# Patient Record
Sex: MALE | Race: WHITE | NOT HISPANIC OR LATINO | Employment: OTHER | URBAN - METROPOLITAN AREA
[De-identification: names, ages, dates, MRNs, and addresses within clinical notes are randomized per-mention and may not be internally consistent; named-entity substitution may affect disease eponyms.]

---

## 2023-11-20 ENCOUNTER — ANCILLARY PROCEDURE (OUTPATIENT)
Dept: RADIOLOGY | Facility: CLINIC | Age: 63
End: 2023-11-20
Payer: COMMERCIAL

## 2023-11-20 DIAGNOSIS — M17.11 PRIMARY OSTEOARTHRITIS OF RIGHT KNEE: ICD-10-CM

## 2023-11-20 PROCEDURE — 73560 X-RAY EXAM OF KNEE 1 OR 2: CPT | Mod: RIGHT SIDE | Performed by: RADIOLOGY

## 2023-11-20 PROCEDURE — 73560 X-RAY EXAM OF KNEE 1 OR 2: CPT | Mod: RT

## 2023-12-11 ENCOUNTER — OFFICE VISIT (OUTPATIENT)
Dept: ORTHOPEDIC SURGERY | Facility: HOSPITAL | Age: 63
End: 2023-12-11
Payer: COMMERCIAL

## 2023-12-11 DIAGNOSIS — M12.561 TRAUMATIC ARTHRITIS OF RIGHT KNEE: Primary | ICD-10-CM

## 2023-12-11 DIAGNOSIS — M17.11 PRIMARY OSTEOARTHRITIS OF RIGHT KNEE: ICD-10-CM

## 2023-12-11 DIAGNOSIS — Z96.651 STATUS POST TOTAL KNEE REPLACEMENT USING CEMENT, RIGHT: ICD-10-CM

## 2023-12-11 PROCEDURE — 99215 OFFICE O/P EST HI 40 MIN: CPT | Performed by: ORTHOPAEDIC SURGERY

## 2023-12-12 NOTE — PROGRESS NOTES
Chief complaint status post conversion to right total knee replacement on 4/30/2021 now having some pain and instability.    History this is a 63-year-old male who had conversion to a right total knee replacement for a [facility] injury.  I have not seen him since April 2022.  He is followed by Dr. Giancarlo Luong and still is on disability for this injury.  This total knee replacement was required as a direct sequelae complication of a tibial plateau fracture and secondary posttraumatic arthritis.  He notices in the last year that the knee somewhat wobbles from time to time.  On a scale of 1-10 he has about 3-4 pain on a routine basis and some swelling.  He has had no fever or chills or sign of infection.    His physical exam reveals a 63-year-old male whose able to ambulate without the use of a cane or walker.  He does have an antalgic gait.  He is able to get up on the exam table.    His right lower extremity reveals that he has full knee extension and about 120 degrees of knee flexion.  He does have a 2+ shift medial laterally in full extension and about 30 degrees of flexion.  It is not severe but it is reproducible.  His patella tracks well.  He has no anterior posterior instability.  He comes out to full extension maybe 1 to 2 degrees of hyperextension.  He has a 2+ dorsalis pedis and posterior tibial pulse.    X-rays of the right knee were done today which display a right total knee replacement in good position.  He has a stem in the tibia which has not subsided or showing any signs of loosening.  The mechanical axis is well-maintained the femur is in good position.  He has a total stabilized tibial poly component which shows no sign of wear.    Assessment 2 and half years status post conversion of previous tibial plateau fracture to right total knee replacement having some residual pain appears to be related to some ligament laxity most likely he is stretching out the damaged ligaments from his original  trauma.    Plan I have talked about the situation with the patient.  I think the instability is rather minimal at this time.  He has about 1-2+ shift medial laterally but he has a stabilized component which is not letting anything sublux.  I think if he would do a little bit more strengthening of the quads and hams he would be in better condition.  In the future if he should develop more instability then increasing the thickness of the poly component may be beneficial.  At this time I think that is much to aggressive.  In fact I think the knee is stable enough that he does not need a knee brace and he tells me he would not wear it anyway most likely.  I would be happy to follow him a little bit more closely.  I have made an appointment to see me in 9 months during the summer.  I want a repeat x-ray of the knee at that time AP and lateral.    Cc copy to Dr. Giancarlo Luong

## 2024-03-23 ENCOUNTER — HOSPITAL ENCOUNTER (OUTPATIENT)
Dept: RADIOLOGY | Facility: HOSPITAL | Age: 64
Discharge: HOME | End: 2024-03-23
Payer: COMMERCIAL

## 2024-03-23 DIAGNOSIS — M17.31 UNILATERAL POST-TRAUMATIC OSTEOARTHRITIS, RIGHT KNEE: ICD-10-CM

## 2024-03-23 DIAGNOSIS — M12.061: ICD-10-CM

## 2024-03-23 DIAGNOSIS — M89.8X6 OTHER SPECIFIED DISORDERS OF BONE, LOWER LEG: ICD-10-CM

## 2024-03-23 PROCEDURE — 73721 MRI JNT OF LWR EXTRE W/O DYE: CPT | Mod: RT

## 2024-03-23 PROCEDURE — 73721 MRI JNT OF LWR EXTRE W/O DYE: CPT | Mod: RIGHT SIDE | Performed by: RADIOLOGY

## 2024-03-25 ENCOUNTER — LAB (OUTPATIENT)
Dept: LAB | Facility: LAB | Age: 64
End: 2024-03-25
Payer: COMMERCIAL

## 2024-03-25 DIAGNOSIS — M12.061: ICD-10-CM

## 2024-03-25 DIAGNOSIS — M89.8X6 OTHER SPECIFIED DISORDERS OF BONE, LOWER LEG: ICD-10-CM

## 2024-03-25 DIAGNOSIS — M17.31 UNILATERAL POST-TRAUMATIC OSTEOARTHRITIS, RIGHT KNEE: ICD-10-CM

## 2024-03-25 LAB
BASOPHILS # BLD AUTO: 0.05 X10*3/UL (ref 0–0.1)
BASOPHILS NFR BLD AUTO: 0.8 %
CRP SERPL-MCNC: 0.43 MG/DL
EOSINOPHIL # BLD AUTO: 0.09 X10*3/UL (ref 0–0.7)
EOSINOPHIL NFR BLD AUTO: 1.5 %
ERYTHROCYTE [DISTWIDTH] IN BLOOD BY AUTOMATED COUNT: 12.5 % (ref 11.5–14.5)
ERYTHROCYTE [SEDIMENTATION RATE] IN BLOOD BY WESTERGREN METHOD: 26 MM/H (ref 0–20)
HCT VFR BLD AUTO: 39 % (ref 41–52)
HGB BLD-MCNC: 13.1 G/DL (ref 13.5–17.5)
IMM GRANULOCYTES # BLD AUTO: 0.02 X10*3/UL (ref 0–0.7)
IMM GRANULOCYTES NFR BLD AUTO: 0.3 % (ref 0–0.9)
LYMPHOCYTES # BLD AUTO: 2.08 X10*3/UL (ref 1.2–4.8)
LYMPHOCYTES NFR BLD AUTO: 33.8 %
MCH RBC QN AUTO: 32 PG (ref 26–34)
MCHC RBC AUTO-ENTMCNC: 33.6 G/DL (ref 32–36)
MCV RBC AUTO: 95 FL (ref 80–100)
MONOCYTES # BLD AUTO: 0.72 X10*3/UL (ref 0.1–1)
MONOCYTES NFR BLD AUTO: 11.7 %
NEUTROPHILS # BLD AUTO: 3.2 X10*3/UL (ref 1.2–7.7)
NEUTROPHILS NFR BLD AUTO: 51.9 %
NRBC BLD-RTO: 0 /100 WBCS (ref 0–0)
PLATELET # BLD AUTO: 285 X10*3/UL (ref 150–450)
RBC # BLD AUTO: 4.09 X10*6/UL (ref 4.5–5.9)
WBC # BLD AUTO: 6.2 X10*3/UL (ref 4.4–11.3)

## 2024-03-25 PROCEDURE — 85025 COMPLETE CBC W/AUTO DIFF WBC: CPT

## 2024-03-25 PROCEDURE — 86140 C-REACTIVE PROTEIN: CPT

## 2024-03-25 PROCEDURE — 36415 COLL VENOUS BLD VENIPUNCTURE: CPT

## 2024-03-25 PROCEDURE — 85652 RBC SED RATE AUTOMATED: CPT

## 2024-04-15 ENCOUNTER — TELEPHONE (OUTPATIENT)
Dept: ORTHOPEDIC SURGERY | Facility: HOSPITAL | Age: 64
End: 2024-04-15
Payer: COMMERCIAL

## 2024-04-16 NOTE — TELEPHONE ENCOUNTER
Copied from CRM #[id number]. Topic: Needs Earlier Appointment  >> Apr 13, 2024 11:52 AM Nilda LIMON wrote:  Patient is scheduled with Dr Brewer on 05/13/24 to [facility] on R knee.  He is inquiring if office could see him sooner.  His R knee started swelling on Thursday.  Swelling went down, but now there is a bruise on knee. He did not hit knee.  He took pictures, but is not sure how to upload to [employer].   He advised that swelling and bruising comes and goes and comes back again.  He is open to office location.  He is requesting a call back from office.  Thank you!

## 2024-04-29 ENCOUNTER — OFFICE VISIT (OUTPATIENT)
Dept: ORTHOPEDIC SURGERY | Facility: HOSPITAL | Age: 64
End: 2024-04-29
Payer: COMMERCIAL

## 2024-04-29 ENCOUNTER — HOSPITAL ENCOUNTER (OUTPATIENT)
Dept: RADIOLOGY | Facility: HOSPITAL | Age: 64
Discharge: HOME | End: 2024-04-29
Payer: COMMERCIAL

## 2024-04-29 DIAGNOSIS — T84.032S LOOSENING OF PROSTHESIS OF RIGHT TOTAL KNEE REPLACEMENT, SEQUELA: Primary | ICD-10-CM

## 2024-04-29 DIAGNOSIS — Z96.651 STATUS POST TOTAL KNEE REPLACEMENT USING CEMENT, RIGHT: ICD-10-CM

## 2024-04-29 PROBLEM — T84.032A: Status: ACTIVE | Noted: 2024-04-29

## 2024-04-29 PROCEDURE — 73560 X-RAY EXAM OF KNEE 1 OR 2: CPT | Mod: RT

## 2024-04-29 PROCEDURE — 73560 X-RAY EXAM OF KNEE 1 OR 2: CPT | Mod: RIGHT SIDE | Performed by: RADIOLOGY

## 2024-04-29 PROCEDURE — 99215 OFFICE O/P EST HI 40 MIN: CPT | Performed by: ORTHOPAEDIC SURGERY

## 2024-04-29 PROCEDURE — 99215 OFFICE O/P EST HI 40 MIN: CPT | Mod: 57 | Performed by: ORTHOPAEDIC SURGERY

## 2024-04-29 PROCEDURE — L1812 KO ELASTIC W/JOINTS PRE OTS: HCPCS | Performed by: ORTHOPAEDIC SURGERY

## 2024-05-13 ENCOUNTER — APPOINTMENT (OUTPATIENT)
Dept: ORTHOPEDIC SURGERY | Facility: HOSPITAL | Age: 64
End: 2024-05-13
Payer: COMMERCIAL

## 2024-07-18 DIAGNOSIS — M12.561 TRAUMATIC ARTHRITIS OF RIGHT KNEE: ICD-10-CM

## 2024-07-18 DIAGNOSIS — Z96.651 STATUS POST TOTAL KNEE REPLACEMENT USING CEMENT, RIGHT: ICD-10-CM

## 2024-07-18 DIAGNOSIS — T84.032S LOOSENING OF PROSTHESIS OF RIGHT TOTAL KNEE REPLACEMENT, SEQUELA: ICD-10-CM

## 2024-07-29 ENCOUNTER — HOSPITAL ENCOUNTER (OUTPATIENT)
Dept: RADIOLOGY | Facility: HOSPITAL | Age: 64
Discharge: HOME | End: 2024-07-29
Payer: COMMERCIAL

## 2024-07-29 ENCOUNTER — OFFICE VISIT (OUTPATIENT)
Dept: ORTHOPEDIC SURGERY | Facility: HOSPITAL | Age: 64
End: 2024-07-29
Payer: COMMERCIAL

## 2024-07-29 DIAGNOSIS — T84.032S LOOSENING OF PROSTHESIS OF RIGHT TOTAL KNEE REPLACEMENT, SEQUELA: ICD-10-CM

## 2024-07-29 DIAGNOSIS — Z96.651 STATUS POST TOTAL KNEE REPLACEMENT USING CEMENT, RIGHT: ICD-10-CM

## 2024-07-29 PROCEDURE — 72170 X-RAY EXAM OF PELVIS: CPT

## 2024-07-29 PROCEDURE — 99214 OFFICE O/P EST MOD 30 MIN: CPT | Performed by: ORTHOPAEDIC SURGERY

## 2024-07-29 PROCEDURE — 99214 OFFICE O/P EST MOD 30 MIN: CPT | Mod: 57 | Performed by: ORTHOPAEDIC SURGERY

## 2024-07-29 NOTE — PROGRESS NOTES
chief complaint my right knee still bothering but I also get pain that goes 5 back into my thigh along my leg and into my foot.    History 63-year-old male who is well-known to me he had a conversion from a previous tibial plateau fracture in 2021 to right total knee replacement.  Removed his hardware and the secondary total knee replacement.  His infectious workup has been negative.  He has a universal tibial component in place with an 11 mm poly which is a TS.  He has a short stem on his tibia and there appears to be no sign loosening of the tibia or femur.    On physical examination today he has a positive straight leg raise on the right side which causes symptoms of radiculopathy in the right lower extremity.  He does appear to have 5 out of 5 motor strength at the hip knee and foot with 2+ dorsalis pedis and posterior tibial pulse.    The right knee on exam has some medial lateral instability may be 2+ in full extension and about 30 degrees of flexion.  There is no sign of infection he has about a 2+ joint effusion today.     I did x-ray his hips today to make sure he did not have hip arthritis and is arthritis minimal with the hip.    Assessment moderate instability of right total knee replacement secondary to ligamentous stretching out over past 3 years and history of ligament injury and fracture before that.  Also, patient does have a radiculopathy into the right lower extremity with no obvious weakness.    Plan I talked the patient about revision of right total knee replacement mostly just involved with a thicker poly to produce more stability.  I will obviously look at the tibial and femoral component to make sure there is no loosening.  Patient is has not had a has had a negative infection workup.  I think if we can get his knee more stable he will have a better result with conservative management of his radiculopathy but if his symptoms continue from radiculopathy after surgery we will have him  evaluated by the spine service.    Pre-op TKR discussion   I talked with the patient at length about risks, limitations, benefits and alternatives to total knee replacement today. The patient's recent knee imaging can be classified as a Grade 4 on the Kellgren Ernie Scale for radiographic classification of osteoarthritis. Grade 4 is severe knee OA with large osteophytes, marked narrowing of joint space, severe sclerosis and definite deformity of bone ends. Under our care or the care of previous providers, the patient has had a reasonable trial of nonoperative treatment for their knee problem including NSAIDS, tylenol or other analgesics, cortisone injections or viscosupplementation, activity modification and activity restriction and use of assistive devices. These previous treatments have not provided the patient with durable relief of their symptoms. The patient is not an appropriate candidate for physical therapy at this time. Despite the above, patient has had pain and symptomatic functional impairment that either interferes with their sleep or ADLs and quality of life. I reviewed concerns about implant wear, loosening, breakage, infection and infection prophylaxis, DVT, PE, death and other medical and anesthetic complications of surgery. We talked about the potential for persistent pain following surgery since there are many possible causes for knee and leg pain. The patient was advised that knee replacement will only relieve pain that is coming from the knee. We talked about limited range of motion following knee replacement and the importance of physical therapy and their motivation. We talked about improvements in pain management post-operatively and our accelerated rehab program. We talked about wound healing issues and neurovascular complications of surgery. I reviewed functional and activity restrictions in detail. We discussed the possible need for a homologous blood transfusion. We discussed the fact  that many of our patients are able to go home in 2-3 days depending on their health, mobility, pre-op preparation, individual home situation and personal preference. The patient should take our pre-operative teaching class. All of the patients questions were answered.         Scc In Situ Histology Text: Full thickness epidermal atypia was seen composed of atypical keratinocytes.   The area of positive cancer is as marked on the Mohs map.

## 2024-08-07 ENCOUNTER — CLINICAL SUPPORT (OUTPATIENT)
Dept: PREADMISSION TESTING | Facility: HOSPITAL | Age: 64
End: 2024-08-07
Payer: COMMERCIAL

## 2024-08-07 RX ORDER — MAGNESIUM OXIDE 420 MG/1
420 TABLET ORAL
COMMUNITY
Start: 2022-11-14

## 2024-08-07 RX ORDER — CALCIUM CARBONATE 500(1250)
TABLET ORAL
COMMUNITY

## 2024-08-07 NOTE — CPM/PAT NURSE NOTE
CPM/PAT Nurse Note      Name: Parth Schreiber (Parth Schreiber)  /Age: 1960/63 y.o.       Past Medical History:   Diagnosis Date    Alcohol abuse     Back pain     Benign lipomatous neoplasm of skin and subcutaneous tissue of left arm     Benign lipomatous neoplasm of skin and subcutaneous tissue of left arm    Chronic hepatitis C (Multi)     Cirrhosis of liver (Multi)     Cutaneous abscess of face     Abscess of external cheek, right    Hyperlipidemia     Hypertension     Lumbago with sciatica, unspecified side 2016    Low back pain with sciatica    Osteoporosis     Personal history of other diseases of the nervous system and sense organs     History of impacted cerumen    Personal history of other diseases of the respiratory system     History of sinusitis    Thrombocytopenia (CMS-HCC)        Past Surgical History:   Procedure Laterality Date    KNEE ARTHROPLASTY Right 2021       Patient  has no history on file for sexual activity.    No family history on file.    Not on File    Prior to Admission medications    Not on File        PAT ROS     DASI Risk Score    No data to display       Caprini DVT Assessment    No data to display       Modified Frailty Index    No data to display       CHADS2 Stroke Risk  Current as of 2 hours ago        N/A 3 to 100%: High Risk   2 to < 3%: Medium Risk   0 to < 2%: Low Risk     Last Change: N/A          This score determines the patient's risk of having a stroke if the patient has atrial fibrillation.        This score is not applicable to this patient. Components are not calculated.          Revised Cardiac Risk Index    No data to display       Apfel Simplified Score    No data to display       Risk Analysis Index Results This Encounter    No data found in the last 1 encounters.     Scheduled for revision arthroplasty total knee- right with Dr. Brewer and Dr. Fulton on 24.  [facility]  [facility]P: [facility] Nkechi Nash @ [facility]  Liver: Dr. Shantell Richards @ [facility]  Orthopaedic Surgery: Jose Brewer MD LOV 24 seen for right knee revision.   -EC23  Normal sinus rhythm  Nonspecific ST abnormality  Abnormal ECG    Nurse Plan of Action:   Unable to reach the patient to review health history. LVM.  ONLY  Requested [facility] records.    Update:  @1100- Patient returned phone call. Patient is currently not at home and is unable to review medications. Instructed to bring a current list with him to his PAT appointment. Patient states he sees two providers through the [facility], PCP and liver specialist.   Analilia Lugo RN  Pre-Admission Testing

## 2024-08-14 ENCOUNTER — HOSPITAL ENCOUNTER (OUTPATIENT)
Dept: CARDIOLOGY | Facility: HOSPITAL | Age: 64
Discharge: HOME | End: 2024-08-14
Payer: COMMERCIAL

## 2024-08-14 ENCOUNTER — PRE-ADMISSION TESTING (OUTPATIENT)
Dept: PREADMISSION TESTING | Facility: HOSPITAL | Age: 64
End: 2024-08-14
Payer: COMMERCIAL

## 2024-08-14 VITALS
HEART RATE: 81 BPM | RESPIRATION RATE: 18 BRPM | TEMPERATURE: 81.3 F | BODY MASS INDEX: 23.38 KG/M2 | SYSTOLIC BLOOD PRESSURE: 99 MMHG | HEIGHT: 71 IN | DIASTOLIC BLOOD PRESSURE: 66 MMHG | WEIGHT: 167 LBS

## 2024-08-14 DIAGNOSIS — Z96.651 STATUS POST TOTAL KNEE REPLACEMENT USING CEMENT, RIGHT: Primary | ICD-10-CM

## 2024-08-14 DIAGNOSIS — Z96.651 STATUS POST TOTAL KNEE REPLACEMENT USING CEMENT, RIGHT: ICD-10-CM

## 2024-08-14 LAB
ABO GROUP (TYPE) IN BLOOD: NORMAL
ANION GAP SERPL CALC-SCNC: 13 MMOL/L (ref 10–20)
ANTIBODY SCREEN: NORMAL
BUN SERPL-MCNC: 13 MG/DL (ref 6–23)
CALCIUM SERPL-MCNC: 10 MG/DL (ref 8.6–10.6)
CHLORIDE SERPL-SCNC: 98 MMOL/L (ref 98–107)
CO2 SERPL-SCNC: 29 MMOL/L (ref 21–32)
CREAT SERPL-MCNC: 1.27 MG/DL (ref 0.5–1.3)
EGFRCR SERPLBLD CKD-EPI 2021: 64 ML/MIN/1.73M*2
GLUCOSE SERPL-MCNC: 80 MG/DL (ref 74–99)
POTASSIUM SERPL-SCNC: 4.2 MMOL/L (ref 3.5–5.3)
RH FACTOR (ANTIGEN D): NORMAL
SODIUM SERPL-SCNC: 136 MMOL/L (ref 136–145)

## 2024-08-14 PROCEDURE — 36415 COLL VENOUS BLD VENIPUNCTURE: CPT

## 2024-08-14 PROCEDURE — 86901 BLOOD TYPING SEROLOGIC RH(D): CPT

## 2024-08-14 PROCEDURE — 99244 OFF/OP CNSLTJ NEW/EST MOD 40: CPT | Performed by: NURSE PRACTITIONER

## 2024-08-14 PROCEDURE — 80048 BASIC METABOLIC PNL TOTAL CA: CPT

## 2024-08-14 RX ORDER — ROSUVASTATIN CALCIUM 5 MG/1
5 TABLET, COATED ORAL
COMMUNITY
Start: 2024-05-07

## 2024-08-14 RX ORDER — CHLORHEXIDINE GLUCONATE ORAL RINSE 1.2 MG/ML
15 SOLUTION DENTAL AS NEEDED
Qty: 473 ML | Refills: 0 | Status: SHIPPED | OUTPATIENT
Start: 2024-08-14 | End: 2024-08-16

## 2024-08-14 RX ORDER — MULTIVITAMIN/IRON/FOLIC ACID 18MG-0.4MG
1 TABLET ORAL
COMMUNITY

## 2024-08-14 RX ORDER — LISINOPRIL AND HYDROCHLOROTHIAZIDE 10; 12.5 MG/1; MG/1
1 TABLET ORAL
COMMUNITY
Start: 2024-07-24

## 2024-08-14 RX ORDER — CHLORHEXIDINE GLUCONATE 40 MG/ML
SOLUTION TOPICAL DAILY PRN
Qty: 473 ML | Refills: 0 | Status: SHIPPED | OUTPATIENT
Start: 2024-08-14 | End: 2024-08-19

## 2024-08-14 RX ORDER — FERROUS SULFATE, DRIED 160(50) MG
1 TABLET, EXTENDED RELEASE ORAL DAILY
COMMUNITY

## 2024-08-14 RX ORDER — TRAMADOL HYDROCHLORIDE 50 MG/1
50 TABLET ORAL EVERY 6 HOURS PRN
COMMUNITY

## 2024-08-14 ASSESSMENT — ENCOUNTER SYMPTOMS
MUSCULOSKELETAL NEGATIVE: 1
WEAKNESS: 1
CONSTITUTIONAL NEGATIVE: 1
GASTROINTESTINAL NEGATIVE: 1
ENDOCRINE NEGATIVE: 1
NUMBNESS: 1
EYES NEGATIVE: 1
NECK NEGATIVE: 1
CARDIOVASCULAR NEGATIVE: 1
RESPIRATORY NEGATIVE: 1

## 2024-08-14 ASSESSMENT — DUKE ACTIVITY SCORE INDEX (DASI)
CAN YOU CLIMB A FLIGHT OF STAIRS OR WALK UP A HILL: YES
CAN YOU RUN A SHORT DISTANCE: YES
CAN YOU DO LIGHT WORK AROUND THE HOUSE LIKE DUSTING OR WASHING DISHES: YES
CAN YOU DO MODERATE WORK AROUND THE HOUSE LIKE VACUUMING, SWEEPING FLOORS OR CARRYING GROCERIES: YES
TOTAL_SCORE: 32.2
CAN YOU HAVE SEXUAL RELATIONS: YES
CAN YOU PARTICIPATE IN STRENOUS SPORTS LIKE SWIMMING, SINGLES TENNIS, FOOTBALL, BASKETBALL, OR SKIING: NO
CAN YOU TAKE CARE OF YOURSELF (EAT, DRESS, BATHE, OR USE TOILET): YES
CAN YOU DO HEAVY WORK AROUND THE HOUSE LIKE SCRUBBING FLOORS OR LIFTING AND MOVING HEAVY FURNITURE: NO
CAN YOU WALK A BLOCK OR TWO ON LEVEL GROUND: YES
CAN YOU PARTICIPATE IN MODERATE RECREATIONAL ACTIVITIES LIKE GOLF, BOWLING, DANCING, DOUBLES TENNIS OR THROWING A BASEBALL OR FOOTBALL: NO
CAN YOU WALK INDOORS, SUCH AS AROUND YOUR HOUSE: YES
DASI METS SCORE: 6.7
CAN YOU DO YARD WORK LIKE RAKING LEAVES, WEEDING OR PUSHING A MOWER: NO

## 2024-08-14 ASSESSMENT — LIFESTYLE VARIABLES: SMOKING_STATUS: NONSMOKER

## 2024-08-14 NOTE — CPM/PAT H&P
CPM/PAT Evaluation       Name: Parth Schreiber (Parth Schreiber)  /Age: 1960/63 y.o.     Visit Type:   In-Person       Chief Complaint: Status post total knee replacement using cement, right    HPI  Pt is a 63 year old male with a history of a tibial plateau fracture in  to right total knee replacement. Pt is s/p hardware removal and secondary total knee replacement. Pt has moderate instability of right total knee replacement and radiculopathy into the right lower extremity. Pt is being evaluated in CPM in anticipation of a Right Total Knee Arthroplasty Revision with Dr. Brewer and Jeanne Fulton on 24.  Past Medical History:   Diagnosis Date    Alcohol abuse     Arthritis     Back pain     Benign lipomatous neoplasm of skin and subcutaneous tissue of left arm     Benign lipomatous neoplasm of skin and subcutaneous tissue of left arm    Chronic hepatitis C (Multi)     treated    Cirrhosis of liver (Multi)     Colon polyp     Cutaneous abscess of face     Abscess of external cheek, right    Depression     History of blood transfusion         HL (hearing loss)     Hyperlipidemia     Hypertension     Lumbago with sciatica, unspecified side 2016    Low back pain with sciatica    Osteoporosis     Personal history of other diseases of the nervous system and sense organs     History of impacted cerumen    Personal history of other diseases of the respiratory system     History of sinusitis       Past Surgical History:   Procedure Laterality Date    COLONOSCOPY      ELBOW SURGERY      KNEE ARTHROPLASTY Right 2021    KNEE SURGERY Right     hardware placement    KNEE SURGERY Right     hardware removed       Patient Sexual activity questions deferred to the physician.    Family History   Problem Relation Name Age of Onset    Heart attack Mother      Liver disease Father         No Known Allergies    Prior to Admission medications    Medication Sig Start Date End Date Taking? Authorizing  Provider   calcium carbonate (Oscal) 500 mg calcium (1,250 mg) tablet Take by mouth.   Yes Historical Provider, MD   calcium carbonate-vitamin D3 500 mg-5 mcg (200 unit) tablet Take 1 tablet by mouth once daily.   Yes Historical Provider, MD   lisinopriL-hydrochlorothiazide 10-12.5 mg tablet Take 1 tablet by mouth. 7/24/24  Yes Historical Provider, MD   magnesium oxide (Mag-Ox) 420 mg tablet Take 1 tablet (420 mg) by mouth. 11/14/22  Yes Historical Provider, MD   rosuvastatin (Crestor) 5 mg tablet Take 1 tablet (5 mg) by mouth. 5/7/24  Yes Historical Provider, MD   traMADol (Ultram) 50 mg tablet Take 1 tablet (50 mg) by mouth every 6 hours if needed.   Yes Historical Provider, MD   b complex 0.4 mg tablet Take 1 tablet by mouth.    Historical Provider, MD ANDERSON ROS:   Constitutional:   neg    Neuro/Psych:    numbness (RLE)   weakness (RLE)  Eyes:   neg    Ears:    hearing loss   tinnitus  Nose:   neg    Mouth:   neg    Throat:   neg    Neck:   neg    Cardio:   neg    Respiratory:   neg    Endocrine:   neg    GI:   neg    :   neg    Musculoskeletal:   neg    Hematologic:   neg    Skin:  neg        Physical Exam  Vitals reviewed.   Constitutional:       Appearance: Normal appearance.   HENT:      Head: Normocephalic and atraumatic.      Nose: Nose normal.      Mouth/Throat:      Mouth: Mucous membranes are moist.   Cardiovascular:      Rate and Rhythm: Normal rate and regular rhythm.      Pulses: Normal pulses.      Heart sounds: Normal heart sounds.   Pulmonary:      Effort: Pulmonary effort is normal.      Breath sounds: Normal breath sounds.   Abdominal:      Palpations: Abdomen is soft.   Musculoskeletal:      Cervical back: Normal range of motion.      Comments: RLE knee brace intact   Skin:     General: Skin is warm.   Neurological:      General: No focal deficit present.      Mental Status: He is alert and oriented to person, place, and time.   Psychiatric:         Mood and Affect: Mood normal.          Behavior: Behavior normal.          PAT AIRWAY:   Airway:     Mallampati::  III    TM distance::  >3 FB    Neck ROM::  Full   upper dentures and lower dentures      Visit Vitals  BP 99/66   Pulse 81   Temp (!) 27.4 °C (81.3 °F)   Resp 18       DASI Risk Score      Flowsheet Row Most Recent Value   DASI SCORE 32.2   METS Score (Will be calculated only when all the questions are answered) 6.7          Caprini DVT Assessment      Flowsheet Row Most Recent Value   DVT Score 9   Current Status Major surgery planned, lasting over 3 hours   History Prior major surgery   Age 60-75 years   BMI 30 or less          Modified Frailty Index      Flowsheet Row Most Recent Value   Modified Frailty Index Calculator .0909          CHADS2 Stroke Risk  Current as of today        N/A 3 to 100%: High Risk   2 to < 3%: Medium Risk   0 to < 2%: Low Risk     Last Change: N/A          This score determines the patient's risk of having a stroke if the patient has atrial fibrillation.        This score is not applicable to this patient. Components are not calculated.          Revised Cardiac Risk Index      Flowsheet Row Most Recent Value   Revised Cardiac Risk Calculator 0          Apfel Simplified Score      Flowsheet Row Most Recent Value   Apfel Simplified Score Calculator 2          Risk Analysis Index Results This Encounter    No data found in the last 1 encounters.       Stop Bang Score      Flowsheet Row Most Recent Value   Do you snore loudly? 1   Do you often feel tired or fatigued after your sleep? 1   Has anyone ever observed you stop breathing in your sleep? 0   Do you have or are you being treated for high blood pressure? 1   Recent BMI (Calculated) 23.3   Is BMI greater than 35 kg/m2? 0=No   Age older than 50 years old? 1=Yes   Is your neck circumference greater than 17 inches (Male) or 16 inches (Female)? 0   Gender - Male 1=Yes   STOP-BANG Total Score 5            Assessment and Plan:     Anesthesia:  The patient denies  problems with anesthesia in the past such as PONV, prolonged sedation, awareness, dental damage, aspiration, cardiac arrest, difficult intubation, or unexpected hospital admissions.     Neuro:   The patient has no neurological diagnoses or significant findings on chart review, clinical presentation, and evaluation. No grossly apparent neurological perioperative risk. The patient is at increased risk for perioperative stroke secondary to hypertension , hyperlipidemia, general anesthesia, operative time >2.5 hours.    HEENT/Airway  No diagnoses, significant findings on chart review, clinical presentation, or evaluation.    Cardiovascular  The patient is scheduled for non-cardiac surgery associated with elevated risk. The patient has no major cardiac contraindications to non- cardiac surgery.  RCRI  The patient meets 0-1 RCRI criteria and therefore has a less than 1% risk of major adverse cardiac complications.  METS  The patient's functional capacity capacity is greater than 4 METS.  EKG  The patient has no EKG or echocardiographic changes concerning for myocardial ischemia.   Heart Failure  The patient has no known history of heart failure.  Additionally, the patient reports no symptoms of heart failure and demonstrates no signs of heart failure.  Hypertension Evaluation  The patient has a known history of hypertension that is controlled.  Patient's hypertension is most consistent with stage 1.  Heart Rhythm Evaluation  The patient has no history of arrhythmias.  Heart Valve Evaluation  The patient has no known history of valvular heart disease. The patient has no symptoms or physical exam findings to suggest valvular heart disease.  Cardiology Evaluation  The patient is not followed by cardiology.    The patient has a 30-day risk for MACE of 0 predictors, 3.9% risk for cardiac death, nonfatal myocardial infarction, and nonfatal cardiac arrest.  JV score which indicates a 0.1% risk of intraoperative or 30-day  postoperative MACE (major adverse cardiac event).    Pulmonary   No significant findings on chart review or clinical presentation and evaluation. The patient is at increased risk of perioperative pulmonary complications secondary to advanced age greater than 60.    The patient has a stop bang score of 5, which places patient at high risk for having ALYSSIA.    ARISCAT 19, low, 1.6% risk of in-hospital postoperative pulmonary complications  PRODIGY 21, high risk of respiratory depression episode. Patient given PI sheet for preoperative deep breathing exercises.    Hematology  No diagnoses or significant findings on chart review or clinical presentation and evaluation.  Antiplatelet management   The patient is not currently receiving antiplatelet therapy.  Anticoagulation management  The patient is not currently receiving anticoagulation therapy.    Caprini score 9, high risk of perioperative VTE.     Patient instructed to ambulate as soon as possible postoperatively to decrease thromboembolic risk. Initiate mechanical DVT prophylaxis as soon as possible and initiate chemical prophylaxis when deemed safe from a bleeding standpoint post surgery.     Transfusion Evaluation  A type and screen was obtained given the likelihood for perioperative transfusion of blood or blood products.    Gastrointestinal  No diagnoses or significant findings on chart review or clinical presentation and evaluation.    Eat 10- 0,  self-perceived oropharyngeal dysphagia scale (0-40)     Genitourinary  No diagnoses or significant findings on chart review or clinical presentation and evaluation.    Renal  The patient has no known history of chronic kidney disease. The patient has specific risk factors associated with increased risk of perioperative renal complications related to age greater than 55, male gender, hypertension.    Musculoskeletal  The patient has diagnoses or significant findings on chart review or clinical presentation and  evaluation significant for osteoarthritis, osteoporosis    Endocrine  Diabetes Evaluation  The patient has no history of diabetes mellitus.  Thyroid Disease Evaluation  The patient has no history of thyroid disease.    ID  No diagnoses or significant findings on chart review or clinical presentation and evaluation. MRSA screening obtained. Prescriptions and instructions given for Hibiclens and Peridex.    -Preoperative medication instructions were provided and reviewed with the patient.  Any additional testing or evaluation was explained to the patient.  NPO Instructions were discussed, and the patient's questions were answered prior to conclusion of this encounter. Patient verbalized understanding of preoperative instructions. After Visit Summary given.      Recent Results (from the past 336 hour(s))   Basic Metabolic Panel    Collection Time: 08/14/24  3:39 PM   Result Value Ref Range    Glucose 80 74 - 99 mg/dL    Sodium 136 136 - 145 mmol/L    Potassium 4.2 3.5 - 5.3 mmol/L    Chloride 98 98 - 107 mmol/L    Bicarbonate 29 21 - 32 mmol/L    Anion Gap 13 10 - 20 mmol/L    Urea Nitrogen 13 6 - 23 mg/dL    Creatinine 1.27 0.50 - 1.30 mg/dL    eGFR 64 >60 mL/min/1.73m*2    Calcium 10.0 8.6 - 10.6 mg/dL   Type And Screen    Collection Time: 08/14/24  3:39 PM   Result Value Ref Range    ABO TYPE A     Rh TYPE POS     ANTIBODY SCREEN NEG    ECG 12 Lead    Collection Time: 08/15/24 11:33 AM   Result Value Ref Range    Ventricular Rate 68 BPM    Atrial Rate 68 BPM    AK Interval 158 ms    QRS Duration 64 ms    QT Interval 398 ms    QTC Calculation(Bazett) 423 ms    P Axis 36 degrees    R Axis 31 degrees    T Axis 26 degrees    QRS Count 11 beats    Q Onset 224 ms    P Onset 145 ms    P Offset 197 ms    T Offset 423 ms    QTC Fredericia 415 ms   CBC    Collection Time: 08/21/24 12:11 PM   Result Value Ref Range    WBC 7.1 4.4 - 11.3 x10*3/uL    nRBC 0.0 0.0 - 0.0 /100 WBCs    RBC 3.86 (L) 4.50 - 5.90 x10*6/uL    Hemoglobin  11.9 (L) 13.5 - 17.5 g/dL    Hematocrit 35.3 (L) 41.0 - 52.0 %    MCV 92 80 - 100 fL    MCH 30.8 26.0 - 34.0 pg    MCHC 33.7 32.0 - 36.0 g/dL    RDW 13.0 11.5 - 14.5 %    Platelets 242 150 - 450 x10*3/uL

## 2024-08-14 NOTE — PREPROCEDURE INSTRUCTIONS
Fasting Guidelines    NPO Instructions:    Do not eat any food after midnight the night before your surgery/procedure.  You may have up to TEN ounces of clear liquids until TWO hours before your instructed arrival time to the hospital. This includes water, black tea/coffee, (no milk or cream), apple juice, and/or electrolyte drinks (Gatorade).  You may chew gum up to TWO hours before your surgery/procedure.    Additional Instructions:     We have sent a prescription for Hibiclens soap and Peridex mouth wash to your preferred pharmacy.  If you have not already, Please  your prescription and start using as directed before surgery.  Follow the instruction sheet provided to you at your CPM/PAT appointment.    Avoid herbal supplements, multivitamins and NSAIDS (non-steroidal anti-inflammatory drugs) such as Advil, Aleve, Ibuprofen, Naproxen, Excedrin, Meloxicam or Celebrex for at least 7 days prior to surgery. May take Tylenol as needed.    Avoid tobacco and alcohol products for 24 hours prior to surgery.    CONTACT SURGEON'S OFFICE IF YOU DEVELOP:  * Fever = 100.4 F   * New respiratory symptoms (e.g. cough, shortness of breath, respiratory distress, sore throat)  * Recent loss of taste or smell  *Flu like symptoms such as headache, fatigue or gastrointestinal symptoms  * You develop any open sores, shingles, burning or painful urination   AND/OR:  * You no longer wish to have the surgery.  * Any other personal circumstances change that may lead to the need to cancel or defer this surgery.  *You were admitted to any hospital within one week of your planned procedure.    Seven/Six Days before Surgery:  Review your medication instructions, stop indicated medications    Day of Surgery:  Review your medication instructions, take indicated medications  Wear comfortable loose fitting clothing  Do not use moisturizers, creams, lotions or perfume  All jewelry and valuables should be left at home      [facility]  [phone]           Patient Information: Pre-Operative Infection Prevention Measures     Why did I have my nose, under my arms, and groin swabbed?  The purpose of the swab is to identify Staphylococcus aureus inside your nose or on your skin.  The swab was sent to the laboratory for culture.  A positive swab/culture for Staphylococcus aureus is called colonization or carriage.      What is Staphylococcus aureus?  Staphylococcus aureus, also known as “staph”, is a germ found on the skin or in the nose of healthy people.  Sometimes Staphylococcus aureus can get into the body and cause an infection.  This can be minor (such as pimples, boils, or other skin problems).  It might also be serious (such as a blood infection, pneumonia, or a surgical site infection).    What is Staphylococcus aureus colonization or carriage?  Colonization or carriage means that a person has the germ but is not sick from it.  These bacteria can be spread on the hands or when breathing or sneezing.    How is Staphylococcus aureus spread?  It is most often spread by close contact with a person or item that carries it.    What happens if my culture is positive for Staphylococcus aureus?  Your doctor/medical team will use this information to guide any antibiotic treatment which may be necessary.  Regardless of the culture results, we will clean the inside of your nose with a betadine swab just before you have your surgery.      Will I get an infection if I have Staphylococcus aureus in my nose or on my skin?  Anyone can get an infection with Staphylococcus aureus.  However, the best way to reduce your risk of infection is to follow the instructions provided to you for the use of your CHG soap and dental rinse.        Patient Information: Oral/Dental Rinse    What is oral/dental rinse?   It is a mouthwash. It is a way of cleaning the mouth with a germ-killing solution before your surgery.  The solution contains  chlorhexidine, commonly known as CHG.   It is used inside the mouth to kill a bacteria known as Staphylococcus aureus.  Let your doctor know if you are allergic to Chlorhexidine.    Why do I need to use CHG oral/dental rinse?  The CHG oral/dental rinse helps to kill a bacteria in your mouth known as Staphylococcus aureus.     This reduces the risk of infection at the surgical site.      Using your CHG oral/dental rinse  STEPS:  Use your CHG oral/dental rinse after you brush your teeth the night before (at bedtime) and the morning of your surgery.  Follow all directions on your prescription label.    Use the cap on the container to measure 15ml   Swish (gargle if you can) the mouthwash in your mouth for at least 30 seconds, (do not swallow) and spit out  After you use your CHG rinse, do not rinse your mouth with water, drink or eat.  Please refer to the prescription label for the appropriate time to resume oral intake      What side effects might I have using the CHG oral/dental rinse?  CHG rinse will stick to plaque on the teeth.  Brush and floss just before use.  Teeth brushing will help avoid staining of plaque during use.      Patient Information: Home Preoperative Antibacterial Shower      What is a home preoperative antibacterial shower?  This shower is a way of cleaning the skin with a germ-killing solution before surgery.  The solution contains chlorhexidine, commonly known as CHG.  CHG is a skin cleanser with germ-killing ability.  Let your doctor know if you are allergic to chlorhexidine.    Why do I need to take a preoperative antibacterial shower?  Skin is not sterile.  It is best to try to make your skin as free of germs as possible before surgery.  Proper cleansing with a germ-killing soap before surgery can lower the number of germs on your skin.  This helps to reduce the risk of infection at the surgical site.  Following the instructions listed below will help you prepare your skin for surgery.       How do I use the solution?  Steps:  Begin using your CHG soap 5 days before your scheduled surgery on ________________________.    First, wash and rinse your hair using the CHG soap. Keep CHG soap away from ear canals and eyes.  Rinse completely, do not condition.  Hair extensions should be removed.  Wash your face with your normal soap and rinse.    Apply the CHG solution to a clean wet washcloth.  Turn the water off or move away from the water spray to avoid premature rinsing of the CHG soap as you are applying.   Firmly lather your entire body from the neck down.  Do not use on your face.  Pay special attention to the area(s) where your incision(s) will be located unless they are on your face.  Avoid scrubbing your skin too hard.  The important point is to have the CHG soap sit on your skin for 3 minutes.    When the 3 minutes are up, turn on the water and rinse the CHG solution off your body completely.   DO NOT wash with regular soap after you have used the CHG soap solution  Pat yourself dry with a clean, freshly-laundered towel.  DO NOT apply powders, deodorants, or lotions.  Dress in clean, freshly laundered nightclothes.    Be sure to sleep with clean, freshly laundered sheets.  Be aware that CHG will cause stains on fabrics; if you wash them with bleach after use.  Rinse your washcloth and other linens that have contact with CHG completely.  Use only non-chlorine detergents to launder the items used.   The morning of surgery is the fifth day.  Repeat the above steps and dress in clean comfortable clothing     Whom should I contact if I have any questions regarding the use of CHG soap?  Call the [facility], [facility] at [phone] if you have any questions.               Preoperative Brain Exercises    What are brain exercises?  A brain exercise is any activity that engages your thinking (cognitive) skills.    What types of activities are  considered brain exercises?  Jigsaw puzzles, crossword puzzles, word jumble, memory games, word search, and many more.  Many can be found free online or on your phone via a mobile darion.    Why should I do brain exercises before my surgery?  More recent research has shown brain exercise before surgery can lower the risk of postoperative delirium (confusion) which can be especially important for older adults.  Patients who did brain exercises for 5 to 10 hours the days before surgery, cut their risk of postoperative delirium in half up to 1 week after surgery.         [facility]    Preoperative Deep Breathing Exercises    Why it is important to do deep breathing exercises before my surgery?  Deep breathing exercises strengthen your breathing muscles.  This helps you to recover after your surgery and decreases the chance of breathing complications.      How are the deep breathing exercises done?  Sit straight with your back supported.  Breathe in deeply and slowly through your nose. Your lower rib cage should expand and your abdomen may move forward.  Hold that breath for 3 to 5 seconds.  Breathe out through pursed lips, slowly and completely.  Rest and repeat 10 times every hour while awake.  Rest longer if you become dizzy or lightheaded.         Patient and Family Education             Ways You Can Help Prevent Blood Clots             This handout explains some simple things you can do to help prevent blood clots.      Blood clots are blockages that can form in the body's veins. When a blood clot forms in your deep veins, it may be called a deep vein thrombosis, or DVT for short. Blood clots can happen in any part of the body where blood flows, but they are most common in the arms and legs. If a piece of a blood clot breaks free and travels to the lungs, it is called a pulmonary embolus (PE). A PE can be a very serious problem.         Being in the hospital or having surgery can raise your  chances of getting a blood clot because you may not be well enough to move around as much as you normally do.         Ways you can help prevent blood clots in the hospital         Wearing SCDs. SCDs stands for Sequential Compression Devices.   SCDs are special sleeves that wrap around your legs  They attach to a pump that fills them with air to gently squeeze your legs every few minutes.   This helps return the blood in your legs to your heart.   SCDs should only be taken off when walking or bathing.   SCDs may not be comfortable, but they can help save your life.               Wearing compression stockings - if your doctor orders them. These special snug fitting stockings gently squeeze your legs to help blood flow.       Walking. Walking helps move the blood in your legs.   If your doctor says it is ok, try walking the halls at least   5 times a day. Ask us to help you get up, so you don't fall.      Taking any blood thinning medicines your doctor orders.        Page 1 of 2     [facility]; 3/23   Ways you can help prevent blood clots at home       Wearing compression stockings - if your doctor orders them. ? Walking - to help move the blood in your legs.       Taking any blood thinning medicines your doctor orders.      Signs of a blood clot or PE      Tell your doctor or nurse know right away if you have of the problems listed below.    If you are at home, seek medical care right away. Call 911 for chest pain or problems breathing.               Signs of a blood clot (DVT) - such as pain,  swelling, redness or warmth in your arm or leg      Signs of a pulmonary embolism (PE) - such as chest     pain or feeling short of breath

## 2024-08-15 LAB
ATRIAL RATE: 68 BPM
P AXIS: 36 DEGREES
P OFFSET: 197 MS
P ONSET: 145 MS
PR INTERVAL: 158 MS
Q ONSET: 224 MS
QRS COUNT: 11 BEATS
QRS DURATION: 64 MS
QT INTERVAL: 398 MS
QTC CALCULATION(BAZETT): 423 MS
QTC FREDERICIA: 415 MS
R AXIS: 31 DEGREES
T AXIS: 26 DEGREES
T OFFSET: 423 MS
VENTRICULAR RATE: 68 BPM

## 2024-08-15 PROCEDURE — 93005 ELECTROCARDIOGRAM TRACING: CPT

## 2024-08-20 ENCOUNTER — PREP FOR PROCEDURE (OUTPATIENT)
Dept: ORTHOPEDIC SURGERY | Facility: HOSPITAL | Age: 64
End: 2024-08-20
Payer: COMMERCIAL

## 2024-08-21 ENCOUNTER — LAB (OUTPATIENT)
Dept: LAB | Facility: LAB | Age: 64
End: 2024-08-21
Payer: COMMERCIAL

## 2024-08-21 DIAGNOSIS — Z96.651 STATUS POST TOTAL KNEE REPLACEMENT USING CEMENT, RIGHT: ICD-10-CM

## 2024-08-21 LAB
ERYTHROCYTE [DISTWIDTH] IN BLOOD BY AUTOMATED COUNT: 13 % (ref 11.5–14.5)
HCT VFR BLD AUTO: 35.3 % (ref 41–52)
HGB BLD-MCNC: 11.9 G/DL (ref 13.5–17.5)
MCH RBC QN AUTO: 30.8 PG (ref 26–34)
MCHC RBC AUTO-ENTMCNC: 33.7 G/DL (ref 32–36)
MCV RBC AUTO: 92 FL (ref 80–100)
NRBC BLD-RTO: 0 /100 WBCS (ref 0–0)
PLATELET # BLD AUTO: 242 X10*3/UL (ref 150–450)
RBC # BLD AUTO: 3.86 X10*6/UL (ref 4.5–5.9)
WBC # BLD AUTO: 7.1 X10*3/UL (ref 4.4–11.3)

## 2024-08-21 PROCEDURE — 85027 COMPLETE CBC AUTOMATED: CPT

## 2024-08-30 ENCOUNTER — ANESTHESIA EVENT (OUTPATIENT)
Dept: OPERATING ROOM | Facility: HOSPITAL | Age: 64
End: 2024-08-30
Payer: COMMERCIAL

## 2024-08-30 ENCOUNTER — ANESTHESIA (OUTPATIENT)
Dept: OPERATING ROOM | Facility: HOSPITAL | Age: 64
End: 2024-08-30
Payer: COMMERCIAL

## 2024-08-30 ENCOUNTER — HOSPITAL ENCOUNTER (INPATIENT)
Facility: HOSPITAL | Age: 64
LOS: 2 days | Discharge: HOME | DRG: 489 | End: 2024-09-01
Attending: ORTHOPAEDIC SURGERY | Admitting: ORTHOPAEDIC SURGERY
Payer: COMMERCIAL

## 2024-08-30 DIAGNOSIS — Z96.651 STATUS POST TOTAL KNEE REPLACEMENT USING CEMENT, RIGHT: ICD-10-CM

## 2024-08-30 DIAGNOSIS — T84.032A LOOSENING OF PROSTHESIS OF RIGHT TOTAL KNEE REPLACEMENT, INITIAL ENCOUNTER (CMS-HCC): Primary | ICD-10-CM

## 2024-08-30 PROBLEM — T84.062A: Status: ACTIVE | Noted: 2024-08-30

## 2024-08-30 LAB
ABO GROUP (TYPE) IN BLOOD: NORMAL
RH FACTOR (ANTIGEN D): NORMAL

## 2024-08-30 PROCEDURE — C1776 JOINT DEVICE (IMPLANTABLE): HCPCS | Performed by: ORTHOPAEDIC SURGERY

## 2024-08-30 PROCEDURE — 2500000004 HC RX 250 GENERAL PHARMACY W/ HCPCS (ALT 636 FOR OP/ED): Mod: JZ | Performed by: STUDENT IN AN ORGANIZED HEALTH CARE EDUCATION/TRAINING PROGRAM

## 2024-08-30 PROCEDURE — 2500000005 HC RX 250 GENERAL PHARMACY W/O HCPCS: Performed by: ANESTHESIOLOGY

## 2024-08-30 PROCEDURE — 2720000007 HC OR 272 NO HCPCS: Performed by: ORTHOPAEDIC SURGERY

## 2024-08-30 PROCEDURE — 3600000010 HC OR TIME - EACH INCREMENTAL 1 MINUTE - PROCEDURE LEVEL FIVE: Performed by: ORTHOPAEDIC SURGERY

## 2024-08-30 PROCEDURE — 2500000004 HC RX 250 GENERAL PHARMACY W/ HCPCS (ALT 636 FOR OP/ED): Performed by: STUDENT IN AN ORGANIZED HEALTH CARE EDUCATION/TRAINING PROGRAM

## 2024-08-30 PROCEDURE — 2500000005 HC RX 250 GENERAL PHARMACY W/O HCPCS: Performed by: STUDENT IN AN ORGANIZED HEALTH CARE EDUCATION/TRAINING PROGRAM

## 2024-08-30 PROCEDURE — 2500000004 HC RX 250 GENERAL PHARMACY W/ HCPCS (ALT 636 FOR OP/ED): Performed by: ORTHOPAEDIC SURGERY

## 2024-08-30 PROCEDURE — RXMED WILLOW AMBULATORY MEDICATION CHARGE

## 2024-08-30 PROCEDURE — P9045 ALBUMIN (HUMAN), 5%, 250 ML: HCPCS | Mod: JZ | Performed by: STUDENT IN AN ORGANIZED HEALTH CARE EDUCATION/TRAINING PROGRAM

## 2024-08-30 PROCEDURE — 0SPC09Z REMOVAL OF LINER FROM RIGHT KNEE JOINT, OPEN APPROACH: ICD-10-PCS | Performed by: ORTHOPAEDIC SURGERY

## 2024-08-30 PROCEDURE — 1100000001 HC PRIVATE ROOM DAILY

## 2024-08-30 PROCEDURE — 7100000001 HC RECOVERY ROOM TIME - INITIAL BASE CHARGE: Performed by: ORTHOPAEDIC SURGERY

## 2024-08-30 PROCEDURE — 2500000001 HC RX 250 WO HCPCS SELF ADMINISTERED DRUGS (ALT 637 FOR MEDICARE OP)

## 2024-08-30 PROCEDURE — 87205 SMEAR GRAM STAIN: CPT | Performed by: ORTHOPAEDIC SURGERY

## 2024-08-30 PROCEDURE — 0SUV09Z SUPPLEMENT RIGHT KNEE JOINT, TIBIAL SURFACE WITH LINER, OPEN APPROACH: ICD-10-PCS | Performed by: ORTHOPAEDIC SURGERY

## 2024-08-30 PROCEDURE — 7100000002 HC RECOVERY ROOM TIME - EACH INCREMENTAL 1 MINUTE: Performed by: ORTHOPAEDIC SURGERY

## 2024-08-30 PROCEDURE — 3700000002 HC GENERAL ANESTHESIA TIME - EACH INCREMENTAL 1 MINUTE: Performed by: ORTHOPAEDIC SURGERY

## 2024-08-30 PROCEDURE — 27486 REVISE/REPLACE KNEE JOINT: CPT | Performed by: PHYSICIAN ASSISTANT

## 2024-08-30 PROCEDURE — 27486 REVISE/REPLACE KNEE JOINT: CPT | Performed by: ORTHOPAEDIC SURGERY

## 2024-08-30 PROCEDURE — 2500000005 HC RX 250 GENERAL PHARMACY W/O HCPCS: Performed by: ORTHOPAEDIC SURGERY

## 2024-08-30 PROCEDURE — 2500000004 HC RX 250 GENERAL PHARMACY W/ HCPCS (ALT 636 FOR OP/ED)

## 2024-08-30 PROCEDURE — 2500000002 HC RX 250 W HCPCS SELF ADMINISTERED DRUGS (ALT 637 FOR MEDICARE OP, ALT 636 FOR OP/ED)

## 2024-08-30 PROCEDURE — 3700000001 HC GENERAL ANESTHESIA TIME - INITIAL BASE CHARGE: Performed by: ORTHOPAEDIC SURGERY

## 2024-08-30 PROCEDURE — A6213 FOAM DRG >16<=48 SQ IN W/BDR: HCPCS | Performed by: ORTHOPAEDIC SURGERY

## 2024-08-30 PROCEDURE — 87102 FUNGUS ISOLATION CULTURE: CPT | Performed by: ORTHOPAEDIC SURGERY

## 2024-08-30 PROCEDURE — 87070 CULTURE OTHR SPECIMN AEROBIC: CPT | Performed by: PHYSICIAN ASSISTANT

## 2024-08-30 PROCEDURE — 2500000004 HC RX 250 GENERAL PHARMACY W/ HCPCS (ALT 636 FOR OP/ED): Performed by: NURSE ANESTHETIST, CERTIFIED REGISTERED

## 2024-08-30 PROCEDURE — 99223 1ST HOSP IP/OBS HIGH 75: CPT

## 2024-08-30 PROCEDURE — 3600000005 HC OR TIME - INITIAL BASE CHARGE - PROCEDURE LEVEL FIVE: Performed by: ORTHOPAEDIC SURGERY

## 2024-08-30 PROCEDURE — 36415 COLL VENOUS BLD VENIPUNCTURE: CPT | Performed by: ORTHOPAEDIC SURGERY

## 2024-08-30 DEVICE — TIBIAL BEARING INSERT - PS
Type: IMPLANTABLE DEVICE | Site: KNEE | Status: FUNCTIONAL
Brand: TRIATHLON

## 2024-08-30 RX ORDER — TRANEXAMIC ACID 100 MG/ML
INJECTION, SOLUTION INTRAVENOUS AS NEEDED
Status: DISCONTINUED | OUTPATIENT
Start: 2024-08-30 | End: 2024-08-30

## 2024-08-30 RX ORDER — HYDROMORPHONE HYDROCHLORIDE 1 MG/ML
0.2 INJECTION, SOLUTION INTRAMUSCULAR; INTRAVENOUS; SUBCUTANEOUS EVERY 5 MIN PRN
Status: DISCONTINUED | OUTPATIENT
Start: 2024-08-30 | End: 2024-08-30 | Stop reason: HOSPADM

## 2024-08-30 RX ORDER — TRANEXAMIC ACID 650 MG/1
1950 TABLET ORAL ONCE
Status: COMPLETED | OUTPATIENT
Start: 2024-08-30 | End: 2024-08-30

## 2024-08-30 RX ORDER — FENTANYL CITRATE 50 UG/ML
INJECTION, SOLUTION INTRAMUSCULAR; INTRAVENOUS AS NEEDED
Status: DISCONTINUED | OUTPATIENT
Start: 2024-08-30 | End: 2024-08-30

## 2024-08-30 RX ORDER — ACETAMINOPHEN 500 MG
500 TABLET ORAL EVERY 6 HOURS
Qty: 120 TABLET | Refills: 2 | Status: SHIPPED | OUTPATIENT
Start: 2024-08-30 | End: 2024-11-28

## 2024-08-30 RX ORDER — TRAMADOL HYDROCHLORIDE 50 MG/1
50 TABLET ORAL EVERY 6 HOURS PRN
Qty: 28 TABLET | Refills: 0 | Status: SHIPPED | OUTPATIENT
Start: 2024-08-30 | End: 2024-08-30

## 2024-08-30 RX ORDER — FERROUS SULFATE, DRIED 160(50) MG
1 TABLET, EXTENDED RELEASE ORAL DAILY
Status: DISCONTINUED | OUTPATIENT
Start: 2024-08-30 | End: 2024-09-01 | Stop reason: HOSPADM

## 2024-08-30 RX ORDER — HYDROMORPHONE HYDROCHLORIDE 1 MG/ML
0.4 INJECTION, SOLUTION INTRAMUSCULAR; INTRAVENOUS; SUBCUTANEOUS EVERY 2 HOUR PRN
Status: DISCONTINUED | OUTPATIENT
Start: 2024-08-30 | End: 2024-09-01 | Stop reason: HOSPADM

## 2024-08-30 RX ORDER — SULFAMETHOXAZOLE AND TRIMETHOPRIM 800; 160 MG/1; MG/1
1 TABLET ORAL 2 TIMES DAILY
Qty: 20 TABLET | Refills: 0 | Status: SHIPPED | OUTPATIENT
Start: 2024-08-30 | End: 2024-09-11

## 2024-08-30 RX ORDER — DEXTROSE 50 % IN WATER (D50W) INTRAVENOUS SYRINGE
25
Status: DISCONTINUED | OUTPATIENT
Start: 2024-08-30 | End: 2024-09-01 | Stop reason: HOSPADM

## 2024-08-30 RX ORDER — NALOXONE HYDROCHLORIDE 0.4 MG/ML
0.2 INJECTION, SOLUTION INTRAMUSCULAR; INTRAVENOUS; SUBCUTANEOUS EVERY 5 MIN PRN
Status: DISCONTINUED | OUTPATIENT
Start: 2024-08-30 | End: 2024-09-01 | Stop reason: HOSPADM

## 2024-08-30 RX ORDER — ASPIRIN 81 MG/1
81 TABLET ORAL 2 TIMES DAILY
Qty: 84 TABLET | Refills: 0 | Status: SHIPPED | OUTPATIENT
Start: 2024-08-30 | End: 2024-10-13

## 2024-08-30 RX ORDER — SODIUM CHLORIDE, SODIUM LACTATE, POTASSIUM CHLORIDE, CALCIUM CHLORIDE 600; 310; 30; 20 MG/100ML; MG/100ML; MG/100ML; MG/100ML
INJECTION, SOLUTION INTRAVENOUS CONTINUOUS PRN
Status: DISCONTINUED | OUTPATIENT
Start: 2024-08-30 | End: 2024-08-30

## 2024-08-30 RX ORDER — ALBUMIN HUMAN 50 G/1000ML
12.5 SOLUTION INTRAVENOUS ONCE
Status: COMPLETED | OUTPATIENT
Start: 2024-08-30 | End: 2024-08-30

## 2024-08-30 RX ORDER — CEFAZOLIN SODIUM 2 G/100ML
2 INJECTION, SOLUTION INTRAVENOUS EVERY 8 HOURS
Status: COMPLETED | OUTPATIENT
Start: 2024-08-30 | End: 2024-08-31

## 2024-08-30 RX ORDER — ONDANSETRON HYDROCHLORIDE 2 MG/ML
4 INJECTION, SOLUTION INTRAVENOUS ONCE AS NEEDED
Status: DISCONTINUED | OUTPATIENT
Start: 2024-08-30 | End: 2024-08-30 | Stop reason: HOSPADM

## 2024-08-30 RX ORDER — PROMETHAZINE HYDROCHLORIDE 25 MG/1
25 SUPPOSITORY RECTAL EVERY 12 HOURS PRN
Status: DISCONTINUED | OUTPATIENT
Start: 2024-08-30 | End: 2024-09-01 | Stop reason: HOSPADM

## 2024-08-30 RX ORDER — CEFAZOLIN SODIUM 2 G/100ML
2 INJECTION, SOLUTION INTRAVENOUS EVERY 8 HOURS
Status: DISCONTINUED | OUTPATIENT
Start: 2024-08-30 | End: 2024-08-30

## 2024-08-30 RX ORDER — VANCOMYCIN HYDROCHLORIDE 1 G/20ML
INJECTION, POWDER, LYOPHILIZED, FOR SOLUTION INTRAVENOUS AS NEEDED
Status: DISCONTINUED | OUTPATIENT
Start: 2024-08-30 | End: 2024-08-30 | Stop reason: HOSPADM

## 2024-08-30 RX ORDER — OXYCODONE HYDROCHLORIDE 5 MG/1
10 TABLET ORAL EVERY 4 HOURS PRN
Status: DISCONTINUED | OUTPATIENT
Start: 2024-08-30 | End: 2024-09-01 | Stop reason: HOSPADM

## 2024-08-30 RX ORDER — CEFAZOLIN 1 G/1
INJECTION, POWDER, FOR SOLUTION INTRAVENOUS AS NEEDED
Status: DISCONTINUED | OUTPATIENT
Start: 2024-08-30 | End: 2024-08-30

## 2024-08-30 RX ORDER — OXYCODONE HYDROCHLORIDE 5 MG/1
5 TABLET ORAL EVERY 6 HOURS PRN
Qty: 20 TABLET | Refills: 0 | Status: SHIPPED | OUTPATIENT
Start: 2024-08-30 | End: 2024-08-30

## 2024-08-30 RX ORDER — POLYETHYLENE GLYCOL 3350 17 G/17G
17 POWDER, FOR SOLUTION ORAL DAILY
Qty: 30 PACKET | Refills: 0 | Status: SHIPPED | OUTPATIENT
Start: 2024-08-30 | End: 2024-08-30

## 2024-08-30 RX ORDER — OXYCODONE HYDROCHLORIDE 5 MG/1
10 TABLET ORAL EVERY 4 HOURS PRN
Status: DISCONTINUED | OUTPATIENT
Start: 2024-08-30 | End: 2024-08-30 | Stop reason: HOSPADM

## 2024-08-30 RX ORDER — ROCURONIUM BROMIDE 10 MG/ML
INJECTION, SOLUTION INTRAVENOUS AS NEEDED
Status: DISCONTINUED | OUTPATIENT
Start: 2024-08-30 | End: 2024-08-30

## 2024-08-30 RX ORDER — LABETALOL HYDROCHLORIDE 5 MG/ML
INJECTION, SOLUTION INTRAVENOUS AS NEEDED
Status: DISCONTINUED | OUTPATIENT
Start: 2024-08-30 | End: 2024-08-30

## 2024-08-30 RX ORDER — SODIUM CHLORIDE, SODIUM LACTATE, POTASSIUM CHLORIDE, CALCIUM CHLORIDE 600; 310; 30; 20 MG/100ML; MG/100ML; MG/100ML; MG/100ML
100 INJECTION, SOLUTION INTRAVENOUS CONTINUOUS
Status: DISCONTINUED | OUTPATIENT
Start: 2024-08-30 | End: 2024-08-30 | Stop reason: HOSPADM

## 2024-08-30 RX ORDER — ALBUTEROL SULFATE 0.83 MG/ML
2.5 SOLUTION RESPIRATORY (INHALATION) ONCE AS NEEDED
Status: DISCONTINUED | OUTPATIENT
Start: 2024-08-30 | End: 2024-08-30 | Stop reason: HOSPADM

## 2024-08-30 RX ORDER — DOCUSATE SODIUM 100 MG/1
100 CAPSULE, LIQUID FILLED ORAL 2 TIMES DAILY
Status: DISCONTINUED | OUTPATIENT
Start: 2024-08-30 | End: 2024-09-01 | Stop reason: HOSPADM

## 2024-08-30 RX ORDER — MEPERIDINE HYDROCHLORIDE 25 MG/ML
12.5 INJECTION INTRAMUSCULAR; INTRAVENOUS; SUBCUTANEOUS EVERY 10 MIN PRN
Status: DISCONTINUED | OUTPATIENT
Start: 2024-08-30 | End: 2024-08-30 | Stop reason: HOSPADM

## 2024-08-30 RX ORDER — LANOLIN ALCOHOL/MO/W.PET/CERES
420 CREAM (GRAM) TOPICAL DAILY
Status: DISCONTINUED | OUTPATIENT
Start: 2024-08-30 | End: 2024-09-01 | Stop reason: HOSPADM

## 2024-08-30 RX ORDER — BISACODYL 10 MG/1
10 SUPPOSITORY RECTAL DAILY PRN
Status: DISCONTINUED | OUTPATIENT
Start: 2024-08-30 | End: 2024-09-01 | Stop reason: HOSPADM

## 2024-08-30 RX ORDER — HYDROMORPHONE HYDROCHLORIDE 1 MG/ML
0.5 INJECTION, SOLUTION INTRAMUSCULAR; INTRAVENOUS; SUBCUTANEOUS EVERY 5 MIN PRN
Status: DISCONTINUED | OUTPATIENT
Start: 2024-08-30 | End: 2024-08-30 | Stop reason: HOSPADM

## 2024-08-30 RX ORDER — LIDOCAINE HCL/PF 100 MG/5ML
SYRINGE (ML) INTRAVENOUS AS NEEDED
Status: DISCONTINUED | OUTPATIENT
Start: 2024-08-30 | End: 2024-08-30

## 2024-08-30 RX ORDER — POLYETHYLENE GLYCOL 3350 17 G/17G
17 POWDER, FOR SOLUTION ORAL DAILY
Status: DISCONTINUED | OUTPATIENT
Start: 2024-08-30 | End: 2024-09-01 | Stop reason: HOSPADM

## 2024-08-30 RX ORDER — MIDAZOLAM HYDROCHLORIDE 1 MG/ML
INJECTION INTRAMUSCULAR; INTRAVENOUS AS NEEDED
Status: DISCONTINUED | OUTPATIENT
Start: 2024-08-30 | End: 2024-08-30

## 2024-08-30 RX ORDER — ASPIRIN 81 MG/1
81 TABLET ORAL 2 TIMES DAILY
Status: DISCONTINUED | OUTPATIENT
Start: 2024-08-30 | End: 2024-09-01 | Stop reason: HOSPADM

## 2024-08-30 RX ORDER — ACETAMINOPHEN 325 MG/1
975 TABLET ORAL EVERY 6 HOURS SCHEDULED
Status: DISCONTINUED | OUTPATIENT
Start: 2024-08-30 | End: 2024-09-01 | Stop reason: HOSPADM

## 2024-08-30 RX ORDER — OXYCODONE HYDROCHLORIDE 5 MG/1
5 TABLET ORAL EVERY 4 HOURS PRN
Status: DISCONTINUED | OUTPATIENT
Start: 2024-08-30 | End: 2024-08-30 | Stop reason: HOSPADM

## 2024-08-30 RX ORDER — TRAMADOL HYDROCHLORIDE 50 MG/1
50 TABLET ORAL EVERY 6 HOURS PRN
Qty: 28 TABLET | Refills: 0 | Status: SHIPPED | OUTPATIENT
Start: 2024-08-30

## 2024-08-30 RX ORDER — OXYCODONE HYDROCHLORIDE 5 MG/1
5 TABLET ORAL EVERY 6 HOURS PRN
Status: DISCONTINUED | OUTPATIENT
Start: 2024-08-30 | End: 2024-09-01 | Stop reason: HOSPADM

## 2024-08-30 RX ORDER — SODIUM CHLORIDE 0.9 G/100ML
IRRIGANT IRRIGATION AS NEEDED
Status: DISCONTINUED | OUTPATIENT
Start: 2024-08-30 | End: 2024-08-30 | Stop reason: HOSPADM

## 2024-08-30 RX ORDER — PANTOPRAZOLE SODIUM 40 MG/1
40 TABLET, DELAYED RELEASE ORAL
Qty: 28 TABLET | Refills: 0 | Status: SHIPPED | OUTPATIENT
Start: 2024-08-30 | End: 2024-08-30

## 2024-08-30 RX ORDER — PROPOFOL 10 MG/ML
INJECTION, EMULSION INTRAVENOUS AS NEEDED
Status: DISCONTINUED | OUTPATIENT
Start: 2024-08-30 | End: 2024-08-30

## 2024-08-30 RX ORDER — OXYCODONE HYDROCHLORIDE 5 MG/1
5 TABLET ORAL EVERY 6 HOURS PRN
Qty: 20 TABLET | Refills: 0 | Status: SHIPPED | OUTPATIENT
Start: 2024-08-30

## 2024-08-30 RX ORDER — POLYETHYLENE GLYCOL 3350 17 G/17G
17 POWDER, FOR SOLUTION ORAL DAILY
Qty: 510 G | Refills: 0 | Status: SHIPPED | OUTPATIENT
Start: 2024-08-30

## 2024-08-30 RX ORDER — ROPIVACAINE HYDROCHLORIDE 5 MG/ML
INJECTION, SOLUTION EPIDURAL; INFILTRATION; PERINEURAL AS NEEDED
Status: DISCONTINUED | OUTPATIENT
Start: 2024-08-30 | End: 2024-08-30

## 2024-08-30 RX ORDER — DIPHENHYDRAMINE HCL 12.5MG/5ML
12.5 LIQUID (ML) ORAL EVERY 6 HOURS PRN
Status: DISCONTINUED | OUTPATIENT
Start: 2024-08-30 | End: 2024-09-01 | Stop reason: HOSPADM

## 2024-08-30 RX ORDER — DEXTROSE 50 % IN WATER (D50W) INTRAVENOUS SYRINGE
12.5
Status: DISCONTINUED | OUTPATIENT
Start: 2024-08-30 | End: 2024-09-01 | Stop reason: HOSPADM

## 2024-08-30 RX ORDER — ACETAMINOPHEN 325 MG/1
650 TABLET ORAL EVERY 4 HOURS PRN
Status: DISCONTINUED | OUTPATIENT
Start: 2024-08-30 | End: 2024-08-30 | Stop reason: HOSPADM

## 2024-08-30 RX ORDER — ACETAMINOPHEN 500 MG
500 TABLET ORAL EVERY 6 HOURS
Qty: 112 TABLET | Refills: 0 | Status: SHIPPED | OUTPATIENT
Start: 2024-08-30 | End: 2024-08-30

## 2024-08-30 RX ORDER — PANTOPRAZOLE SODIUM 40 MG/1
40 TABLET, DELAYED RELEASE ORAL
Qty: 30 TABLET | Refills: 0 | Status: SHIPPED | OUTPATIENT
Start: 2024-08-30 | End: 2024-10-01

## 2024-08-30 RX ORDER — ASPIRIN 81 MG/1
81 TABLET ORAL 2 TIMES DAILY
Qty: 84 TABLET | Refills: 0 | Status: SHIPPED | OUTPATIENT
Start: 2024-08-30 | End: 2024-08-30

## 2024-08-30 RX ORDER — DROPERIDOL 2.5 MG/ML
0.62 INJECTION, SOLUTION INTRAMUSCULAR; INTRAVENOUS ONCE AS NEEDED
Status: DISCONTINUED | OUTPATIENT
Start: 2024-08-30 | End: 2024-08-30 | Stop reason: HOSPADM

## 2024-08-30 RX ORDER — SODIUM CHLORIDE, SODIUM LACTATE, POTASSIUM CHLORIDE, CALCIUM CHLORIDE 600; 310; 30; 20 MG/100ML; MG/100ML; MG/100ML; MG/100ML
100 INJECTION, SOLUTION INTRAVENOUS CONTINUOUS
Status: ACTIVE | OUTPATIENT
Start: 2024-08-30 | End: 2024-08-31

## 2024-08-30 RX ORDER — ONDANSETRON HYDROCHLORIDE 2 MG/ML
4 INJECTION, SOLUTION INTRAVENOUS EVERY 8 HOURS PRN
Status: DISCONTINUED | OUTPATIENT
Start: 2024-08-30 | End: 2024-09-01 | Stop reason: HOSPADM

## 2024-08-30 RX ORDER — PHENYLEPHRINE HCL IN 0.9% NACL 0.4MG/10ML
SYRINGE (ML) INTRAVENOUS AS NEEDED
Status: DISCONTINUED | OUTPATIENT
Start: 2024-08-30 | End: 2024-08-30

## 2024-08-30 RX ORDER — BISACODYL 5 MG
10 TABLET, DELAYED RELEASE (ENTERIC COATED) ORAL DAILY PRN
Status: DISCONTINUED | OUTPATIENT
Start: 2024-08-30 | End: 2024-09-01 | Stop reason: HOSPADM

## 2024-08-30 RX ORDER — PROMETHAZINE HYDROCHLORIDE 25 MG/1
25 TABLET ORAL EVERY 6 HOURS PRN
Status: DISCONTINUED | OUTPATIENT
Start: 2024-08-30 | End: 2024-09-01 | Stop reason: HOSPADM

## 2024-08-30 RX ORDER — ONDANSETRON HYDROCHLORIDE 2 MG/ML
INJECTION, SOLUTION INTRAVENOUS AS NEEDED
Status: DISCONTINUED | OUTPATIENT
Start: 2024-08-30 | End: 2024-08-30

## 2024-08-30 RX ORDER — LIDOCAINE HYDROCHLORIDE 10 MG/ML
0.1 INJECTION INFILTRATION; PERINEURAL ONCE
Status: DISCONTINUED | OUTPATIENT
Start: 2024-08-30 | End: 2024-08-30 | Stop reason: HOSPADM

## 2024-08-30 RX ORDER — ONDANSETRON 4 MG/1
4 TABLET, ORALLY DISINTEGRATING ORAL EVERY 8 HOURS PRN
Status: DISCONTINUED | OUTPATIENT
Start: 2024-08-30 | End: 2024-09-01 | Stop reason: HOSPADM

## 2024-08-30 RX ORDER — KETOROLAC TROMETHAMINE 30 MG/ML
30 INJECTION, SOLUTION INTRAMUSCULAR; INTRAVENOUS EVERY 6 HOURS
Status: COMPLETED | OUTPATIENT
Start: 2024-08-30 | End: 2024-08-31

## 2024-08-30 SDOH — SOCIAL STABILITY: SOCIAL INSECURITY: ABUSE: ADULT

## 2024-08-30 SDOH — SOCIAL STABILITY: SOCIAL INSECURITY: HAVE YOU HAD ANY THOUGHTS OF HARMING ANYONE ELSE?: NO

## 2024-08-30 SDOH — SOCIAL STABILITY: SOCIAL INSECURITY: HAVE YOU HAD THOUGHTS OF HARMING ANYONE ELSE?: NO

## 2024-08-30 SDOH — SOCIAL STABILITY: SOCIAL INSECURITY: ARE THERE ANY APPARENT SIGNS OF INJURIES/BEHAVIORS THAT COULD BE RELATED TO ABUSE/NEGLECT?: NO

## 2024-08-30 SDOH — SOCIAL STABILITY: SOCIAL INSECURITY: DOES ANYONE TRY TO KEEP YOU FROM HAVING/CONTACTING OTHER FRIENDS OR DOING THINGS OUTSIDE YOUR HOME?: NO

## 2024-08-30 SDOH — SOCIAL STABILITY: SOCIAL INSECURITY: ARE YOU OR HAVE YOU BEEN THREATENED OR ABUSED PHYSICALLY, EMOTIONALLY, OR SEXUALLY BY ANYONE?: NO

## 2024-08-30 SDOH — SOCIAL STABILITY: SOCIAL INSECURITY: DO YOU FEEL UNSAFE GOING BACK TO THE PLACE WHERE YOU ARE LIVING?: NO

## 2024-08-30 SDOH — SOCIAL STABILITY: SOCIAL INSECURITY: DO YOU FEEL ANYONE HAS EXPLOITED OR TAKEN ADVANTAGE OF YOU FINANCIALLY OR OF YOUR PERSONAL PROPERTY?: NO

## 2024-08-30 SDOH — SOCIAL STABILITY: SOCIAL INSECURITY: HAS ANYONE EVER THREATENED TO HURT YOUR FAMILY OR YOUR PETS?: NO

## 2024-08-30 ASSESSMENT — LIFESTYLE VARIABLES
HOW OFTEN DO YOU HAVE A DRINK CONTAINING ALCOHOL: NEVER
HOW MANY STANDARD DRINKS CONTAINING ALCOHOL DO YOU HAVE ON A TYPICAL DAY: PATIENT DOES NOT DRINK
PRESCIPTION_ABUSE_PAST_12_MONTHS: NO
HOW OFTEN DO YOU HAVE 6 OR MORE DRINKS ON ONE OCCASION: NEVER
AUDIT-C TOTAL SCORE: 0
SKIP TO QUESTIONS 9-10: 1
AUDIT-C TOTAL SCORE: 0
SUBSTANCE_ABUSE_PAST_12_MONTHS: NO

## 2024-08-30 ASSESSMENT — PAIN SCALES - GENERAL
PAINLEVEL_OUTOF10: 6
PAINLEVEL_OUTOF10: 10 - WORST POSSIBLE PAIN
PAINLEVEL_OUTOF10: 8
PAINLEVEL_OUTOF10: 0 - NO PAIN
PAINLEVEL_OUTOF10: 6
PAINLEVEL_OUTOF10: 7
PAINLEVEL_OUTOF10: 6
PAINLEVEL_OUTOF10: 10 - WORST POSSIBLE PAIN
PAINLEVEL_OUTOF10: 7
PAINLEVEL_OUTOF10: 10 - WORST POSSIBLE PAIN
PAINLEVEL_OUTOF10: 6
PAINLEVEL_OUTOF10: 10 - WORST POSSIBLE PAIN
PAINLEVEL_OUTOF10: 7
PAINLEVEL_OUTOF10: 6

## 2024-08-30 ASSESSMENT — COGNITIVE AND FUNCTIONAL STATUS - GENERAL
MOBILITY SCORE: 22
DAILY ACTIVITIY SCORE: 24
PATIENT BASELINE BEDBOUND: NO
CLIMB 3 TO 5 STEPS WITH RAILING: A LITTLE
WALKING IN HOSPITAL ROOM: A LITTLE

## 2024-08-30 ASSESSMENT — COLUMBIA-SUICIDE SEVERITY RATING SCALE - C-SSRS
2. HAVE YOU ACTUALLY HAD ANY THOUGHTS OF KILLING YOURSELF?: NO
6. HAVE YOU EVER DONE ANYTHING, STARTED TO DO ANYTHING, OR PREPARED TO DO ANYTHING TO END YOUR LIFE?: NO
2. HAVE YOU ACTUALLY HAD ANY THOUGHTS OF KILLING YOURSELF?: NO
6. HAVE YOU EVER DONE ANYTHING, STARTED TO DO ANYTHING, OR PREPARED TO DO ANYTHING TO END YOUR LIFE?: NO
1. IN THE PAST MONTH, HAVE YOU WISHED YOU WERE DEAD OR WISHED YOU COULD GO TO SLEEP AND NOT WAKE UP?: NO
1. IN THE PAST MONTH, HAVE YOU WISHED YOU WERE DEAD OR WISHED YOU COULD GO TO SLEEP AND NOT WAKE UP?: NO

## 2024-08-30 ASSESSMENT — ACTIVITIES OF DAILY LIVING (ADL)
TOILETING: INDEPENDENT
PATIENT'S MEMORY ADEQUATE TO SAFELY COMPLETE DAILY ACTIVITIES?: YES
HEARING - RIGHT EAR: FUNCTIONAL
HEARING - LEFT EAR: FUNCTIONAL
FEEDING YOURSELF: INDEPENDENT
DRESSING YOURSELF: INDEPENDENT
WALKS IN HOME: INDEPENDENT
JUDGMENT_ADEQUATE_SAFELY_COMPLETE_DAILY_ACTIVITIES: YES
BATHING: INDEPENDENT
GROOMING: INDEPENDENT
LACK_OF_TRANSPORTATION: NO
ADEQUATE_TO_COMPLETE_ADL: YES

## 2024-08-30 ASSESSMENT — PATIENT HEALTH QUESTIONNAIRE - PHQ9
2. FEELING DOWN, DEPRESSED OR HOPELESS: NOT AT ALL
SUM OF ALL RESPONSES TO PHQ9 QUESTIONS 1 & 2: 0
1. LITTLE INTEREST OR PLEASURE IN DOING THINGS: NOT AT ALL

## 2024-08-30 ASSESSMENT — PAIN - FUNCTIONAL ASSESSMENT
PAIN_FUNCTIONAL_ASSESSMENT: 0-10

## 2024-08-30 ASSESSMENT — PAIN DESCRIPTION - ORIENTATION: ORIENTATION: RIGHT

## 2024-08-30 ASSESSMENT — PAIN DESCRIPTION - LOCATION: LOCATION: KNEE

## 2024-08-30 NOTE — BRIEF OP NOTE
Date: 2024  OR Location: [facility] OR    Name: Parth Schreiber : 1960, Age: 63 y.o., MRN: [id number], Sex: male    Diagnosis  Pre-op Diagnosis      * Status post total knee replacement using cement, right [Z96.651] Post-op Diagnosis     * Status post total knee replacement using cement, right [Z96.651]     Procedures  Revision Arthroplasty Total Knee  96286 - AZ REVJ TOTAL KNEE ARTHRP W/WO ALGRFT 1 COMPONENT    [place] OPTX ANKLE DISLOCATION W/O REPAIR/INTERNAL FIXJ [75296]  Surgeons      * Jose Brewer - Primary    Resident/Fellow/Other Assistant:  Surgeons and Role:     * Jeanne Fulton PA-C - LAURIE First Assist     * Danish Travis MD - LAURIE First Assist    Procedure Summary  Anesthesia: General  ASA: ASA status not filed in the log.  Anesthesia Staff: Anesthesiologist: Star Acevedo MD; Jono Gage DO  CRNA: KEVIN Mary-CRNA  Estimated Blood Loss: 35mL  Intra-op Medications: * Intraprocedure medication information is unavailable because the case start and end events have not been set *           Anesthesia Record               Intraprocedure I/O Totals          Intake    Tranexamic Acid 0.00 mL    The total shown is the total volume documented since Anesthesia Start was filed.    LR infusion 900.00 mL    Total Intake 900 mL       Output    Est. Blood Loss 50 mL    Total Output 50 mL       Net    Net Volume 850 mL          Specimen:   ID Type Source Tests Collected by Time   A : right knee 1 Swab KNEE ARTHROPLASTY RIGHT FUNGAL CULTURE/SMEAR, TISSUE/WOUND CULTURE/SMEAR Jose Brewer MD 2024 1132   B : right knee 2 Swab KNEE ARTHROPLASTY RIGHT TISSUE/WOUND CULTURE/SMEAR Jose Brewer MD 2024 1133   C : right knee tissue Swab KNEE ARTHROPLASTY RIGHT TISSUE/WOUND CULTURE/SMEAR Jose Brewer MD 2024 1207        Staff:   Juan: Rayshawn Grace Person: Griselda Grace Person: Tiffany  Circulator: Ayaka          Findings: Right total knee replacement with  varus/valgus instability    Complications:  None; patient tolerated the procedure well.     Disposition: PACU - hemodynamically stable.  Condition: stable  Specimens Collected:   ID Type Source Tests Collected by Time   A : right knee 1 Swab KNEE ARTHROPLASTY RIGHT FUNGAL CULTURE/SMEAR, TISSUE/WOUND CULTURE/SMEAR Jose Brewer MD 8/30/2024 1132   B : right knee 2 Swab KNEE ARTHROPLASTY RIGHT TISSUE/WOUND CULTURE/SMEAR Jose Brewer MD 8/30/2024 1133   C : right knee tissue Swab KNEE ARTHROPLASTY RIGHT TISSUE/WOUND CULTURE/SMEAR Jose Brewer MD 8/30/2024 1207     Attending Attestation: I was present and scrubbed for the entire procedure.    Jose Brewer  Phone Number: [phone]   communication

## 2024-08-30 NOTE — ANESTHESIA PREPROCEDURE EVALUATION
Patient: Parth Schreiber    Procedure Information       Anesthesia Start Date/Time: 24 1037    Procedure: Revision Arthroplasty Total Knee (Right: Knee) - RIGHT KNEE REVISION CPT 54961 [facility]    Location: [facility] OR 09 /  [facility] OR    Surgeons: Jose Brewer MD            Relevant Problems   Anesthesia (within normal limits)       Clinical information reviewed:   Tobacco  Allergies    Med Hx  Surg Hx   Fam Hx  Soc Hx        NPO/Void Status  Carbohydrate Drink Given Prior to Surgery? : N  Date of Last Liquid: 24  Time of Last Liquid:   Date of Last Solid: 24  Time of Last Solid:   Last Intake Type: Food  Time of Last Void: 08           Past Medical History:   Diagnosis Date    Alcohol abuse     Arthritis     Back pain     Benign lipomatous neoplasm of skin and subcutaneous tissue of left arm     Benign lipomatous neoplasm of skin and subcutaneous tissue of left arm    Chronic hepatitis C (Multi)     treated    Cirrhosis of liver (Multi)     Colon polyp     Cutaneous abscess of face     Abscess of external cheek, right    Depression     History of blood transfusion         HL (hearing loss)     Hyperlipidemia     Hypertension     Lumbago with sciatica, unspecified side 2016    Low back pain with sciatica    Osteoporosis     Personal history of other diseases of the nervous system and sense organs     History of impacted cerumen    Personal history of other diseases of the respiratory system     History of sinusitis      Past Surgical History:   Procedure Laterality Date    COLONOSCOPY      ELBOW SURGERY      KNEE ARTHROPLASTY Right 2021    KNEE SURGERY Right     hardware placement    KNEE SURGERY Right     hardware removed     Social History     Tobacco Use    Smoking status: Former     Current packs/day: 0.00     Types: Cigarettes     Quit date:      Years since quittin.6    Smokeless tobacco: Never   Vaping Use    Vaping status: Never Used  "  Substance Use Topics    Alcohol use: Not Currently    Drug use: Never      Current Outpatient Medications   Medication Instructions    b complex 0.4 mg tablet 1 tablet, oral    calcium carbonate (Oscal) 500 mg calcium (1,250 mg) tablet oral    calcium carbonate-vitamin D3 500 mg-5 mcg (200 unit) tablet 1 tablet, oral, Daily    lisinopriL-hydrochlorothiazide 10-12.5 mg tablet 1 tablet, oral    magnesium oxide (MAG-OX) 420 mg, oral    rosuvastatin (CRESTOR) 5 mg, oral    traMADol (ULTRAM) 50 mg, oral, Every 6 hours PRN      No Known Allergies     Chemistry    Lab Results   Component Value Date/Time     08/14/2024 1539    K 4.2 08/14/2024 1539    CL 98 08/14/2024 1539    CO2 29 08/14/2024 1539    BUN 13 08/14/2024 1539    CREATININE 1.27 08/14/2024 1539    Lab Results   Component Value Date/Time    CALCIUM 10.0 08/14/2024 1539    ALKPHOS 66 01/22/2020 1106    AST 14 01/22/2020 1106    ALT 14 01/22/2020 1106    BILITOT 0.7 01/22/2020 1106          Lab Results   Component Value Date/Time    WBC 7.1 08/21/2024 1211    HGB 11.9 (L) 08/21/2024 1211    HCT 35.3 (L) 08/21/2024 1211     08/21/2024 1211     No results found for: \"PROTIME\", \"PTT\", \"INR\"  Encounter Date: 08/14/24   ECG 12 Lead   Result Value    Ventricular Rate 68    Atrial Rate 68    CT Interval 158    QRS Duration 64    QT Interval 398    QTC Calculation(Bazett) 423    P Axis 36    R Axis 31    T Axis 26    QRS Count 11    Q Onset 224    P Onset 145    P Offset 197    T Offset 423    QTC Fredericia 415    Narrative    Normal sinus rhythm  Possible Left atrial enlargement  Low voltage QRS  Junctional ST depression, probably normal  Incomplete right bundle branch block  When compared with ECG of 14-APR-2021 10:35,  Questionable change in QRS duration  Confirmed by Junior Bautista ([id number]) on 8/15/2024 11:43:59 AM     No results found for this or any previous visit from the past 1095 days.       Visit Vitals  /83   Pulse 80   Temp 36.3 °C (97.3 " "°F) (Tympanic)   Resp 18   Ht 1.78 m (5' 10.08\")   Wt 73.9 kg (162 lb 14.7 oz)   SpO2 97%   BMI 23.32 kg/m²   Smoking Status Former   BSA 1.91 m²        Anesthesia Evaluation      Airway   Mallampati: II  TM distance: >3 FB  Neck ROM: full  Dental - normal exam   (+) upper dentures and lower dentures    Pulmonary - normal exam   Cardiovascular - normal exam    Neuro/Psych      GI/Hepatic/Renal      Endo/Other    Abdominal  - normal exam                      Physical Exam    Airway  Mallampati: II  TM distance: >3 FB  Neck ROM: full     Cardiovascular - normal exam     Dental - normal exam  (+) upper dentures, lower dentures     Pulmonary - normal exam     Abdominal - normal exam              Anesthesia Plan    History of general anesthesia?: yes  History of complications of general anesthesia?: no    ASA 3     general     intravenous induction   Postoperative administration of opioids is intended.  Trial extubation is planned.  Anesthetic plan and risks discussed with patient.    Plan discussed with CRNA.       "

## 2024-08-30 NOTE — ANESTHESIA PROCEDURE NOTES
Peripheral Block    Patient location during procedure: pre-op  Start time: 8/30/2024 9:50 AM  End time: 8/30/2024 10:14 AM  Reason for block: at surgeon's request and post-op pain management  Staffing  Performed: resident and attending   Authorized by: Jono Gage DO    Performed by: Erin Pandey MD  Preanesthetic Checklist  Completed: patient identified, IV checked, site marked, risks and benefits discussed, surgical consent, monitors and equipment checked, pre-op evaluation and timeout performed   Timeout performed at: 8/30/2024 9:50 AM  Peripheral Block  Patient position: laying flat  Prep: ChloraPrep  Patient monitoring: heart rate and continuous pulse ox  Block type: popliteal and adductor canal  Laterality: right  Injection technique: single-shot  Guidance: ultrasound guided  Local infiltration: ropivacaine  Infiltration strength: 0.5 %  Dose: 15 mL  Needle  Needle type: Tuohy   Needle gauge: 26 G  Needle length: 8 cm  Needle localization: ultrasound guidance     image stored in chart  Assessment  Injection assessment: negative aspiration for heme, no paresthesia on injection, incremental injection and local visualized surrounding nerve on ultrasound  Additional Notes  Popliteal and saphenous nerve block: Informed consent obtained.  Risks, benefits, and alternatives discussed.  ASA monitors placed and timeout performed.  Patient positioned, prepped with chlorhexidine, and draped with sterile towels.  Ultrasound guidance was used to visualize the tibia and common peroneal nerves at the popliteal fossa and surrounding structures with visualization of the needle throughout duration of the procedure.  Aspiration was negative.  15 cc of 0.5% ropivacaine, dexamethasone 4 mg, and 1:200,000 epinephrine injected.      Ultrasound guidance was used to visualize the saphenous nerve at the adductor canal and surrounding structures with visualization of the needle throughout duration of the procedure.   Aspiration was negative.  15 cc of 0.5% ropivacaine, dexamethasone 4 mg, and 1:200,000 epinephrine injected.  Patient tolerated the procedure well.

## 2024-08-30 NOTE — OP NOTE
Revision Arthroplasty Total Knee (R) Operative Note     Date: 2024  OR Location: [facility] OR    Name: Parth Schreiber : 1960, Age: 63 y.o., MRN: [id number], Sex: male    Diagnosis  Pre-op Diagnosis      * Status post total knee replacement using cement, right [Z96.651] Post-op Diagnosis     * Status post total knee replacement using cement, right [Z96.651]     Procedures  Revision Arthroplasty Total Knee  33873 - CA REVJ TOTAL KNEE ARTHRP W/WO ALGRFT 1 COMPONENT    CA OPTX ANKLE DISLOCATION W/O REPAIR/INTERNAL FIXJ [95737]  Surgeons      * Jose Brewer - Primary    Resident/Fellow/Other Assistant:  Surgeons and Role:     * Jeanne Fulton PA-C - LAURIE First Assist     * Danish Travis MD - LAURIE First Assist    Procedure Summary  Anesthesia: Anesthesia type not filed in the log.  ASA: ASA status not filed in the log.  Anesthesia Staff: Anesthesiologist: Jono Gage DO  CRNA: KEVIN Mary-CRNA  Estimated Blood Loss: 150mL  Intra-op Medications: * Intraprocedure medication information is unavailable because the case start and end events have not been set *           Anesthesia Record               Intraprocedure I/O Totals          Intake    Tranexamic Acid 0.00 mL    The total shown is the total volume documented since Anesthesia Start was filed.    Total Intake 0 mL          Specimen:   ID Type Source Tests Collected by Time   A : right knee 1 Swab KNEE ARTHROPLASTY RIGHT FUNGAL CULTURE/SMEAR, TISSUE/WOUND CULTURE/SMEAR Jose Brewer MD 2024 1132   B : right knee 2 Swab KNEE ARTHROPLASTY RIGHT TISSUE/WOUND CULTURE/SMEAR Jose Brewer MD 2024 1133   C : right knee tissue Swab KNEE ARTHROPLASTY RIGHT TISSUE/WOUND CULTURE/SMEAR Jose Brewer MD 2024 1207        Staff:   Jessicaulator: Rayshawn Grace Person: Griselda Grace Person: Tiffany  Circulator: Ayaka         Drains and/or Catheters: * None in log *    Tourniquet Times:     Total Tourniquet Time Documented:  Leg  (Right) - 41 minutes  Total: Leg (Right) - 41 minutes      Implants:  Implants       Type Name Action Serial No.       Tibial bearing insert Implanted               Findings: instability moderate    Indications: Parth Schreiber is an 63 y.o. male who is having surgery for Status post total knee replacement using cement, right [Z96.651].     The patient was seen in the preoperative area. The risks, benefits, complications, treatment options, non-operative alternatives, expected recovery and outcomes were discussed with the patient. The possibilities of reaction to medication, pulmonary aspiration, injury to surrounding structures, bleeding, recurrent infection, the need for additional procedures, failure to diagnose a condition, and creating a complication requiring transfusion or operation were discussed with the patient. The patient concurred with the proposed plan, giving informed consent.  The site of surgery was properly noted/marked if necessary per policy. The patient has been actively warmed in preoperative area. Preoperative antibiotics have been ordered and given within 1 hours of incision. Venous thrombosis prophylaxis have been ordered including chemical prophylaxis    Procedure Details operative procedure    Preop diagnosis mechanical ligament laxity right total knee replacement lateral collateral ligament, postop diagnosis same        Procedure revision of right total knee replacement partial tibial component    Surgeon Osman first Assistant  Jeanne Morfin P:ASTER Morfin PA-C participated in this case as the first assistant, performing components of the positioning, retraction, exposure, reduction, stabilization, and closure. Due to the nature and complexity of the case, no qualified resident of an appropriate level was available to assist.  Second assistant was Danish Travis secondary orthopedic resident.    Anesthesia General    Estimated blood loss 150 ml    Operative indications this is a  63-year-old male who had a severe right tibial plateau fracture over 4 years ago which I ended up taking care of after the fact.  He had collapse of the lateral plateau removal of hardware and then we did a secondary total knee replacement with a stem in the tibia and a TS poly 11.  Over the past year he has noticed clicking in the knee and the knee has become a little bit unstable particularly about 30 degrees of flexion.  He notes a click produces some pain and instability.  We tried conservative management with physical therapy and this did not help.  His workup for infection was completely negative.  His tibial and femoral component appeared to be well-fixed and well aligned but on exam at full extension he had some moderate instability and varus mostly in varus and in about 30 degrees of flexion he had he did have a click or pop.  I talked about the risks and benefits of revising his tibial opponent in particular I believe that his ligaments did stretch out quickly since they been damaged from the previous trauma the risks and benefits of revising the tibia were discussed with him the agreed to proceed.  Operative procedure after full huddle was performed the operating patient adequate general anesthesia.  Patient was given 2 g of Ancef IV and appropriate TXA.  Patient was placed supine on the operating table.  A tourniquet was placed in the proximal right thigh.  The entire right lower extremity was then prepped draped in usual sterile fashion.  A surgical pause was performed.    Under general anesthesia I examined the knee and in full extension there was mild to moderate instability and very very mostly varus not valgus when we got to 30 degrees of flexion there was more of a pop sensation in the valgus instability went from mild to moderate to moderate to severe.  We then exsanguinated the right lower extremity and elevated the tourniquet.    A incision was made in line with the previous scar just medial to  the midline.  There were several scars laterally from the previous fractures.  The incision was taken down and a medial parapatellar approach was made.  A medial release was performed along the proximal tibia for better exposure a small pin was placed in the tibial tubercle to prevent avulsion.  The patella was slid laterally.  A medial and lateral synovectomy were performed.  Cultures were taken but there is no sign of infection and there was very little fluid in the joint.  The femoral component was well-fixed on clinical examination grossly the patella component was well-fixed and the tibial component was well-fixed.  We took time laterally exposing the poly component.  1 we had the knee in full extension and flexion we could see that there was a slight instability and varus with this of the 11 mm component.  We used an oscillating saw to remove the poly portion of the post and then remove the metal portion internally then we were able to use an osteotome and pop the 11 mm x 6 size tibial poly off the universal tibial component.  We did not damage universal tibial component.  We copiously irrigated out the system we cleared the scar tissue from around the edges of the universal [place] #6 tibial component.  And then we upsized the poly.  We first went to a 13 TS poly component and we noted that it was much more stable but there was still a slight pop at about 30 degrees of flexion which appeared to be the long post clicking into the PS femoral component.  We then tried a 16 mm poly TS trial but the knee did not come out to full extension and it was also tight in flexion.  Lastly we tried a 14 mm PS poly.  This PS poly did not sublux at all in flexion extension and was stable in varus valgus in flexion and extension. we opened up to a 14 mm by #6 PS tibial component we used a 2 prong retractor to sublux the tibia anteriorly and we impacted the number 6...14 mm tibial component onto the universal tibial component  and engaged appropriately.  We then placed the knee through full extension full flexion and it is was a very smooth transition.  There was no popping or clicking in the knee was stable in flexion extension.  At this point we released the tourniquet we injected 30 mg of Toradol 30 cc of 0.25% Marcaine and 30 cc of saline locally.  We closed the fascia with interrupted #1 Ethibond #1 Vicryl suture.  We closed the skin with 2-0 Vicryl and 4-0 Monocryl suture and Steri-Strips.    A sterile dressing was applied patient would be weightbearing as tolerated postoperatively he would receive early range of motion without restriction.  He also could receive early strengthening exercises.  He will receive aspirin 81 mg twice daily for DVT prophylaxis for a period of 1 month.  Patient tolerated procedure well    Complications:  None; patient tolerated the procedure well.    Disposition: PACU - hemodynamically stable.  Condition: stable         Additional Details:     Attending Attestation: I was present and scrubbed for the entire procedure.    Jose Brewer  Phone Number: [phone]

## 2024-08-30 NOTE — H&P
[facility] Department of Orthopaedic Surgery   Surgical History & Physical >30 Days    Reason for Surgery: Right total knee instability  Planned Procedure: Revision right total knee replacement    History & Physical Reviewed:  Parth Schreiber is a 63 y.o. male presenting with the above symptoms. This patient was evaluated as an outpatient, and a plan was made for operative management. Risks and benefits were discussed, and the patient and/or caregivers elected to proceed. The patient presents for the above listed procedure today.     Past Medical History:   Diagnosis Date    Alcohol abuse     Arthritis     Back pain     Benign lipomatous neoplasm of skin and subcutaneous tissue of left arm     Benign lipomatous neoplasm of skin and subcutaneous tissue of left arm    Chronic hepatitis C (Multi)     treated    Cirrhosis of liver (Multi)     Colon polyp     Cutaneous abscess of face     Abscess of external cheek, right    Depression     History of blood transfusion         HL (hearing loss)     Hyperlipidemia     Hypertension     Lumbago with sciatica, unspecified side 2016    Low back pain with sciatica    Osteoporosis     Personal history of other diseases of the nervous system and sense organs     History of impacted cerumen    Personal history of other diseases of the respiratory system     History of sinusitis     Past Surgical History:   Procedure Laterality Date    COLONOSCOPY      ELBOW SURGERY      KNEE ARTHROPLASTY Right 2021    KNEE SURGERY Right     hardware placement    KNEE SURGERY Right     hardware removed     Social History     Tobacco Use    Smoking status: Former     Current packs/day: 0.00     Types: Cigarettes     Quit date: 2016     Years since quittin.6    Smokeless tobacco: Never   Substance Use Topics    Alcohol use: Not Currently     Prior to Admission medications    Medication Sig Start Date End Date Taking? Authorizing Provider   b complex 0.4 mg tablet Take 1  tablet by mouth.    Historical Provider, MD   calcium carbonate (Oscal) 500 mg calcium (1,250 mg) tablet Take by mouth.    Historical Provider, MD   calcium carbonate-vitamin D3 500 mg-5 mcg (200 unit) tablet Take 1 tablet by mouth once daily.    Historical Provider, MD   lisinopriL-hydrochlorothiazide 10-12.5 mg tablet Take 1 tablet by mouth. 7/24/24   Historical Provider, MD   magnesium oxide (Mag-Ox) 420 mg tablet Take 1 tablet (420 mg) by mouth. 11/14/22   Historical Provider, MD   rosuvastatin (Crestor) 5 mg tablet Take 1 tablet (5 mg) by mouth. 5/7/24   Historical Provider, MD   traMADol (Ultram) 50 mg tablet Take 1 tablet (50 mg) by mouth every 6 hours if needed.    Historical Provider, MD     No Known Allergies    Review of Systems:   Gen: Denies recent weight loss  Neuro: Denies recent confusion  Ophtho: Denies changes in vision  ENT: Denies changes in hearing  Endo: Denies weight loss/weight gain  CV: Denies chest pain  Resp: Denies shortness of breath  GI: Denies melena/hematochezia  : Denies painful urination  MSK: Per above HPI  Heme: No abnormal bleeding  Psych: Denies hallucinations    Physical Exam:  - Constitutional: No acute distress, cooperative  - Eyes: EOM grossly intact  - Head/Neck: Trachea midline  - Respiratory/Thorax: Normal work of breathing  - Cardiovascular: RRR on peripheral palpation  - Gastrointestinal: Nondistended  - Psychological: Appropriate mood/behavior  - Skin: Warm and dry. Additional findings in musculoskeletal evaluation  - Musculoskeletal:   The right knee on exam has some medial lateral instability may be 2+ in full extension and about 30 degrees of flexion.     ERAS patient?: No    COVID-19 Risk Consent:   Surgeon has reviewed the key risks related to karen COVID-19 and subsequent sequelae.       Danish Travis MD 08/30/24

## 2024-08-30 NOTE — DISCHARGE INSTRUCTIONS
MD Jeanne Fried, MPAS, PA-C  [address] [address]  [phone]    PLEASE READ CAREFULLY BEFORE CONTACTING YOUR PROVIDER.    WE WORK COLLABORATIVELY AS A TEAM. CALLING MULTIPLE STAFF MEMBERS REGARDING THE SAME ISSUE WILL DELAY YOUR CARE.  MYCHART IS THE PREFERRED COMMUNICATION FOR ALL TEAM MEMBERS.    Postoperative Instructions: TOTAL HIP & TOTAL KNEE ARTHROPLASTY    JOINT CARE TEAM  Please use the information below to contact your care team following surgery.  If you are leaving a message, please include your full name, date of birth and date of surgery so that we can correctly identify you.  Your call will be returned within 1-2 business days, please do not leave multiple messages regarding a single issue while you are awaiting a return call.     Who to call Contact Information Matters needing handled   Rachel TARANGO, RN  Ortho/Trauma Nurse Navigator   [phone]   Prescription Refills   Medical questions/concerns  Orders for dental antibiotics lifelong  Nursing, medical question related questions or concerns within 6 weeks of surgery   Orders for Outpatient Physical Therapy         [place]           [phone] [address]     Scheduling office Visits  Leave of Absence or other paperwork  Any concerns more than 6 weeks from surgery - an appointment will need to be made     MEDICATION REFILLS - EMELINA Watkins, RN (MyChart or Main Office)    You will not receive a call indicating that your prescription has been filled.  Please contact your pharmacy with any questions.    Medication refills will be filled Monday-Friday 7am to 1pm ONLY. Please call the office or send a [employer] message for a refill request.  Any requests received outside of this timeframe will be handled on the next business day.  Messages should be left directly through the office or via my chart.  Please do not call multiple times or call other members of the care team for medication  needs, this will cause the refill to take longer.    Per State and Institutional policy, pain medications can only be refilled every 7 days for up to six weeks following surgery.    DISCHARGE MEDICATIONS - Please reference the sample schedule on the reverse side for instructions on how to best schedule medications.  PAIN MEDICATION    ___X___Tramadol / Oxycodone  Tramadol and Oxycodone have been prescribed for post-operative pain control.    These medications will only be refilled ONCE every 7 days for a period of up to 6 weeks following surgery.  After 6 weeks, you will transition to acetaminophen and over -the- counter anti-inflammatories such as Ibuprofen, Advil or Aleve in conjunction with ICE/COLD THERAPY.   Side effects may be constipation and nausea, vomiting, sleepiness, dizziness, lightheadedness, headache, blurred vision, dry mouth sweating, itching (if you have itching, over-the -counter Benadryl can be used as needed).  You may NOT operate a motor vehicle while taking these medications or have been cleared by your care team.     ___X___ Acetaminophen (Tylenol)  Acetaminophen has been prescribed as an adjunct for pain control. Take two 500 mg tablets every 6 hours for 4 weeks. You will not receive a refill on this medication.  Do not exceed 4000mg of acetaminophen within a 24 hour period.  Side effects may include nausea, heartburn, drowsiness, and headache.    _______ Meloxicam (Mobic)-Meloxicam has been prescribed as an adjunct anti-inflammatory to assist in pain control.    Take one 15mg tablet once daily for 4 weeks.  You will not receive refills on this medication.   Side effects may include nausea.  May not be prescribed if you are on a more potent blood thinner than aspirin or have chronic kidney disease.    BLOOD THINNER    ___X___Blood Thinner   A blood thinner has been prescribed to prevent blood clots in your leg or lungs. Take as prescribed on the bottle for 4 weeks. You will not receive a  refill on this medication.    ANTI NAUSEA    ______Pantoprazole (Protonix)  Pantoprazole has been prescribed to help with nausea and protect your stomach while taking pain medication. Take one 40 mg tablet once daily for 4 weeks. You will not receive a refill on this medication.    _______ Zofran   Zofran has been prescribed to help with nausea and protect your stomach while taking pain medication. Take one 4 mg tablet every eight hours as needed for nausea. Refills will be provided as necessary.    STOOL SOFTENERS    ___X___ Colace (Docusate Sodium) & Miralax (polyethylene glycol)  Take both medications to help with constipation while using the Oxycodone and Tramadol for pain control.  You will not receive a refill on this medication.    ______ Senna  Senna has been prescribed to help with constipation while on Oxycodone and Tramadol. Take one tab, once daily. Senna is a laxative used to treat constipation.  Side effects may include nausea, vomiting, persistent diarrhea, abdominal cramps, and discolored urine.      You will not receive refills on the following medications:  Acetaminophen (Tylenol)  Meloxicam  Miralax  Colace  Pantoprazole  Blood Thinner    Pain Medication Refills [phone] or [facility]- Monday through Friday 7am-1pm    Medication refills will be sent upon receipt of your request during the times listed above. Due to the high call volume, you will not receive a call confirming prescription refills; please do not call multiple times.  Prescription refills may take a few hours to process, you may follow up with your pharmacy for pickup availability.    SAMPLE              The times below are an example of how to organize medications to optimize pain control  Your actual medication schedule may vary based on your last dose taken IN THE HOSPITAL        Time 3:00 am 6:00 am 9:00 am 12:00 pm 3:00 pm 6:00 pm 9:00 pm 12:00 am   Medications Tramadol Tylenol  Oxycodone  Miralax   Blood  Thinner  Colace  Pantoprazole  Tramadol  Meloxicam Tylenol  Oxycodone Tramadol Tylenol  Oxycodone  Miralax Blood Thinner  Colace  Tramadol   Tylenol  Oxycodone          You may begin to wean off the pain medication as your pain remains controlled with increased activity.  The schedules provided are meant to serve as an example.  You may wean off based on your pain control.  Please note that pain medications are not filled beyond 6 weeks after surgery.              The times below are an example of how to WEAN OFF medications WHILE CONTINUING TO OPTIMIZE PAIN CONTROL.  Your actual medication schedule may vary based on your last dose taken.    Time 12:00am 4:00am 8:00am 12:00pm 4:00pm 8:00pm   Med Tramadol Oxycodone   Tramadol Oxycodone Tramadol Oxycodone     Time 12:00am 6:00am 12:00pm 6:00pm   Med Tramadol Oxycodone   Tramadol Oxycodone     Time 12:00am 8:00am 4:00pm   Med Tramadol Oxycodone   Tramadol     Time 12:00am 12:00pm   Med Tramadol Tramadol

## 2024-08-30 NOTE — CONSULTS
Parth Schreiber is a 63 y.o. year old male patient who presents for Right  knee revision total arthroplasty with Dr. Brewer. Acute Pain consulted for block for postoperative pain control.     Anticipated Postop Pain Issues -   Palliative: typically relieved with IV analgesics and regional local anesthetics  Provocative: typically with movement  Quality: typically burning and aching  Radiation: typically none  Severity: typically severe 8-10/10  Timing: typically constant    Past Medical History:   Diagnosis Date    Alcohol abuse     Arthritis     Back pain     Benign lipomatous neoplasm of skin and subcutaneous tissue of left arm     Benign lipomatous neoplasm of skin and subcutaneous tissue of left arm    Chronic hepatitis C (Multi)     treated    Cirrhosis of liver (Multi)     Colon polyp     Cutaneous abscess of face     Abscess of external cheek, right    Depression     History of blood transfusion     2018    HL (hearing loss)     Hyperlipidemia     Hypertension     Lumbago with sciatica, unspecified side 03/08/2016    Low back pain with sciatica    Osteoporosis     Personal history of other diseases of the nervous system and sense organs     History of impacted cerumen    Personal history of other diseases of the respiratory system     History of sinusitis        Past Surgical History:   Procedure Laterality Date    COLONOSCOPY      ELBOW SURGERY      KNEE ARTHROPLASTY Right 04/30/2021    KNEE SURGERY Right     hardware placement    KNEE SURGERY Right     hardware removed        Family History   Problem Relation Name Age of Onset    Heart attack Mother      Liver disease Father          Social History     Socioeconomic History    Marital status: Single     Spouse name: Not on file    Number of children: Not on file    Years of education: Not on file    Highest education level: Not on file   Occupational History    Not on file   Tobacco Use    Smoking status: Former     Current packs/day: 0.00     Types:  Cigarettes     Quit date: 2016     Years since quittin.6    Smokeless tobacco: Never   Vaping Use    Vaping status: Never Used   Substance and Sexual Activity    Alcohol use: Not Currently    Drug use: Never    Sexual activity: Defer   Other Topics Concern    Not on file   Social History Narrative    Not on file     Social Determinants of Health     Financial Resource Strain: Not on file   Food Insecurity: Not on file   Transportation Needs: Not on file   Physical Activity: Not on file   Stress: Not on file   Social Connections: Not on file   Intimate Partner Violence: Not on file   Housing Stability: Not on file        No Known Allergies      Review of Systems  Gen: No fatigue, anorexia, insomnia, fever.   Eyes: No vision loss, double vision, drainage, eye pain.   ENT: No pharyngitis, dry mouth, no hearing changes or ear discharge  Cardiac: No chest pain, palpitations, syncope, near syncope.   Pulmonary: No shortness of breath, cough, hemoptysis.   Heme/lymph: No swollen glands, fever, bleeding.   GI: No abdominal pain, change in bowel habits, melena, hematemesis, hematochezia, nausea, vomiting, diarrhea.   : No discharge, dysuria, frequency, urgency, hematuria.  Endo: No polyuria or weight loss.   Musculoskeletal: Negative for any pain or loss of ROM/weakness  Skin: No rashes or lesions  Neuro: Normal speech, no numbness or weakness. No gait difficulties  Review of systems is otherwise negative unless stated above or in history of present illness.    Physical Exam:  Constitutional:  no distress, alert and cooperative  Eyes: clear sclera  Head/Neck: No apparent injury, trachea midline  Respiratory/Thorax: Patent airways, thorax symmetric, breathing comfortably  Cardiovascular: no pitting edema  Gastrointestinal: Nondistended  Musculoskeletal: ROM intact  Extremities: no clubbing  Neurological: alert, hairston x4  Psychological: Appropriate affect    Results for orders placed or performed during the hospital  encounter of 08/30/24 (from the past 24 hour(s))   Verify ABO/Rh Group Test (VERAB)   Result Value Ref Range    ABO TYPE A     Rh TYPE POS       Parth Schreiber is a 63 y.o. year old male patient who presents for Right  knee revision total arthroplasty with Dr. Brewer. Acute Pain consulted for block for postoperative pain control.     Plan:    - Right AC single shot block performed preoperatively on 8/30/24  - Please be aware of local anesthetic toxic dose and absorption variability before considering lidocaine patches  - Acute pain service will follow   - Rest of pain management per primary team    Acute Pain Resident  pg [phone] ph [phone]

## 2024-08-30 NOTE — ANESTHESIA PROCEDURE NOTES
Airway  Date/Time: 8/30/2024 10:52 AM  Urgency: elective    Airway not difficult    Staffing  Performed: ANY   Authorized by: Jono Gage DO    Performed by: Damián Milligan RN  Patient location during procedure: OR    Indications and Patient Condition  Indications for airway management: anesthesia and airway protection  Spontaneous Ventilation: absent  Sedation level: deep  Preoxygenated: yes  Patient position: sniffing  MILS not maintained throughout  Mask difficulty assessment: 1 - vent by mask  Planned trial extubation    Final Airway Details  Final airway type: endotracheal airway      Successful airway: ETT  Cuffed: yes   Successful intubation technique: direct laryngoscopy  Facilitating devices/methods: intubating stylet  Endotracheal tube insertion site: oral  Blade: Fito  Blade size: #4  ETT size (mm): 7.5  Cormack-Lehane Classification: grade I - full view of glottis  Placement verified by: chest auscultation and capnometry   Cuff volume (mL): -10  Measured from: teeth  ETT to teeth (cm): 21  Number of attempts at approach: 1  Ventilation between attempts: BVM  Number of other approaches attempted: 0    Additional Comments  Ett taped in place

## 2024-08-30 NOTE — ANESTHESIA POSTPROCEDURE EVALUATION
Patient: Parth Schreiber    Procedure Summary       Date: 08/30/24 Room / Location: [facility] OR 09 / [facility] OR    Anesthesia Start: 1037 Anesthesia Stop: 1242    Procedure: Revision Arthroplasty Total Knee (Right: Knee) Diagnosis:       Status post total knee replacement using cement, right      (Status post total knee replacement using cement, right [Z96.651])    Surgeons: Jose Brewer MD Responsible Provider: Star Acevedo MD    Anesthesia Type: general ASA Status: 3            Anesthesia Type: general    Vitals Value Taken Time   /68 08/30/24 1252   Temp 36 08/30/24 1256   Pulse 64 08/30/24 1254   Resp 10 08/30/24 1254   SpO2 94 % 08/30/24 1254   Vitals shown include unfiled device data.    Anesthesia Post Evaluation    Patient location during evaluation: PACU  Patient participation: complete - patient participated  Level of consciousness: awake  Pain management: adequate  Airway patency: patent  Cardiovascular status: acceptable  Respiratory status: acceptable  Hydration status: acceptable  Postoperative Nausea and Vomiting: none        There were no known notable events for this encounter.

## 2024-08-30 NOTE — PROGRESS NOTES
"Orthopaedic Surgery Progress Note    Subjective: Evaluated in immediate postoperative period. Pain well controlled considering recent surgery. Denies chest pain, shortness of breath, or fevers.    Objective:  /72   Pulse 78   Temp 36.3 °C (97.3 °F) (Tympanic)   Resp 15   Ht 1.78 m (5' 10.08\")   Wt 73.9 kg (162 lb 14.7 oz)   SpO2 98%   BMI 23.32 kg/m²     Gen: arousable, NAD, appropriately conversational  Cardiac: RRR to peripheral palpation  Resp: nonlabored on RA  GI: soft, nondistended    MSK:  GEN - NAD, resting comfortably in hospital bed  HEENT - MMM, EOMI, NCAT  CV - RRR by peripheral palpation, limbs wwp  PULM - NWOB on RA  NEURO - MAGANA spontaneously, CNs II - XII grossly intact  PSYCH - Appropriate mood and affect    RLE:   - Post-operative dressing in place without strikethrough bleeding.   -Motor intact in DF/PF/EHL/FHL  -SILT in saph/sural/SPN/DPN distributions  -Foot wwp, 2+ DP/PT pulse, brisk cap refill  -Compartments soft and compressible, no pain with passive dorsiflexion      Assessment/Plan: 63 y.o. male s/p R knee poly exchange on 8/30/24 with Dr. Brewer.      Plan:  - Weight bearing: WBAT RLE  - DVT ppx: SCDs, ASA bid  - Diet: Regular  - Pain: Tylenol, oxycodone 5/10, dilaudid breakthrough  - Antibiotics: perioperative ancef 2g q8hr x3 doses. Discharge with 10 days of Bactrim DS  - FEN: HLIV with good PO intake  - Bowel Regimen: Miralax, senna, dulcolax  - PT/OT  - Pulm: Encourage IS  - Continue home medications  - No blanton    Dispo: pending PT, likely home tomorrow    Danish Travis MD  Orthopaedic Surgery PGY-2  Available by Epic Chat    While admitted, this patient will be followed by the Ortho Trauma Team, available via Epic Chat weekdays 6a-6p. Please page [phone] on nights and weekends.    Ortho Trauma  First Call: Timoteo Santillan and Tavares Dominguez, PGY-1 (include both)  Second Call: Danish Travis PGY-2  Third Call: Juice Black PGY-3  "

## 2024-08-31 ENCOUNTER — HOME HEALTH ADMISSION (OUTPATIENT)
Dept: HOME HEALTH SERVICES | Facility: HOME HEALTH | Age: 64
End: 2024-08-31
Payer: COMMERCIAL

## 2024-08-31 LAB
ANION GAP SERPL CALC-SCNC: 14 MMOL/L (ref 10–20)
BUN SERPL-MCNC: 23 MG/DL (ref 6–23)
CALCIUM SERPL-MCNC: 8.6 MG/DL (ref 8.6–10.6)
CHLORIDE SERPL-SCNC: 97 MMOL/L (ref 98–107)
CO2 SERPL-SCNC: 25 MMOL/L (ref 21–32)
CREAT SERPL-MCNC: 1.87 MG/DL (ref 0.5–1.3)
EGFRCR SERPLBLD CKD-EPI 2021: 40 ML/MIN/1.73M*2
ERYTHROCYTE [DISTWIDTH] IN BLOOD BY AUTOMATED COUNT: 13.1 % (ref 11.5–14.5)
GLUCOSE SERPL-MCNC: 118 MG/DL (ref 74–99)
HCT VFR BLD AUTO: 28.3 % (ref 41–52)
HGB BLD-MCNC: 9.7 G/DL (ref 13.5–17.5)
MCH RBC QN AUTO: 31.1 PG (ref 26–34)
MCHC RBC AUTO-ENTMCNC: 34.3 G/DL (ref 32–36)
MCV RBC AUTO: 91 FL (ref 80–100)
NRBC BLD-RTO: 0 /100 WBCS (ref 0–0)
PLATELET # BLD AUTO: 216 X10*3/UL (ref 150–450)
POTASSIUM SERPL-SCNC: 4.3 MMOL/L (ref 3.5–5.3)
RBC # BLD AUTO: 3.12 X10*6/UL (ref 4.5–5.9)
SODIUM SERPL-SCNC: 132 MMOL/L (ref 136–145)
WBC # BLD AUTO: 13.3 X10*3/UL (ref 4.4–11.3)

## 2024-08-31 PROCEDURE — 97161 PT EVAL LOW COMPLEX 20 MIN: CPT | Mod: GP

## 2024-08-31 PROCEDURE — 2500000001 HC RX 250 WO HCPCS SELF ADMINISTERED DRUGS (ALT 637 FOR MEDICARE OP)

## 2024-08-31 PROCEDURE — 82374 ASSAY BLOOD CARBON DIOXIDE: CPT

## 2024-08-31 PROCEDURE — 1100000001 HC PRIVATE ROOM DAILY

## 2024-08-31 PROCEDURE — 85027 COMPLETE CBC AUTOMATED: CPT

## 2024-08-31 PROCEDURE — 99231 SBSQ HOSP IP/OBS SF/LOW 25: CPT | Performed by: STUDENT IN AN ORGANIZED HEALTH CARE EDUCATION/TRAINING PROGRAM

## 2024-08-31 PROCEDURE — 36415 COLL VENOUS BLD VENIPUNCTURE: CPT

## 2024-08-31 PROCEDURE — 2500000002 HC RX 250 W HCPCS SELF ADMINISTERED DRUGS (ALT 637 FOR MEDICARE OP, ALT 636 FOR OP/ED)

## 2024-08-31 PROCEDURE — 2500000004 HC RX 250 GENERAL PHARMACY W/ HCPCS (ALT 636 FOR OP/ED)

## 2024-08-31 PROCEDURE — 97116 GAIT TRAINING THERAPY: CPT | Mod: GP

## 2024-08-31 ASSESSMENT — PAIN SCALES - WONG BAKER
WONGBAKER_NUMERICALRESPONSE: HURTS WHOLE LOT
WONGBAKER_NUMERICALRESPONSE: HURTS WORST

## 2024-08-31 ASSESSMENT — COGNITIVE AND FUNCTIONAL STATUS - GENERAL
DAILY ACTIVITIY SCORE: 24
MOBILITY SCORE: 20
STANDING UP FROM CHAIR USING ARMS: A LITTLE
CLIMB 3 TO 5 STEPS WITH RAILING: A LITTLE
WALKING IN HOSPITAL ROOM: A LITTLE
MOBILITY SCORE: 22
WALKING IN HOSPITAL ROOM: A LITTLE
MOVING TO AND FROM BED TO CHAIR: A LITTLE
CLIMB 3 TO 5 STEPS WITH RAILING: A LITTLE

## 2024-08-31 ASSESSMENT — PAIN SCALES - GENERAL
PAINLEVEL_OUTOF10: 5 - MODERATE PAIN
PAINLEVEL_OUTOF10: 7
PAINLEVEL_OUTOF10: 1
PAINLEVEL_OUTOF10: 8
PAINLEVEL_OUTOF10: 7
PAINLEVEL_OUTOF10: 8
PAINLEVEL_OUTOF10: 6
PAINLEVEL_OUTOF10: 8
PAINLEVEL_OUTOF10: 7
PAINLEVEL_OUTOF10: 7
PAINLEVEL_OUTOF10: 6

## 2024-08-31 ASSESSMENT — PAIN - FUNCTIONAL ASSESSMENT
PAIN_FUNCTIONAL_ASSESSMENT: PAINAD (PAIN ASSESSMENT IN ADVANCED DEMENTIA SCALE)
PAIN_FUNCTIONAL_ASSESSMENT: 0-10
PAIN_FUNCTIONAL_ASSESSMENT: 0-10

## 2024-08-31 ASSESSMENT — PAIN SCALES - PAIN ASSESSMENT IN ADVANCED DEMENTIA (PAINAD): TOTALSCORE: MEDICATION (SEE MAR);HOME MEDICATION

## 2024-08-31 NOTE — PROGRESS NOTES
08/31/24 1648   Current Planned Discharge Disposition   Current Planned Discharge Disposition Home     [facility] notified that patient was rec'd low and ready for discharge tomorrow. [facility] contacted patient by phone. He states insurance currently is Workers Comp for this hospitalization. Injury originally from 2018 and states [facility] is  [facility]. A faxed copy of C9 located under media files, but unclear if home care has been approved. Pt believes he was approved for outpatient therapy but is not certain. Pt states Dr. Luong (PT) faxed copy of C9 to . He has upcoming outpt therapy appt next month but could not recall the date. P[facility] is [facility]. Team notified that approval from [facility] will have to be on hold until after the holiday, as office is closed over the weekend. Pt states he has family that can provide him with ride home tonight if released.    Rachana Lopez RN  (Weekend Transitional Care Coordinator-[facility], send Epic message)

## 2024-08-31 NOTE — PROGRESS NOTES
"Orthopaedic Surgery Progress Note    Subjective: No acute events overnight.  Denies fevers, chills, nausea, vomiting.  Denies chest pain or shortness of breath.  Compressive bandage in place without strikethrough.    Objective:  /63   Pulse 69   Temp 36.1 °C (97 °F) (Temporal)   Resp 16   Ht 1.78 m (5' 10.08\")   Wt 73.9 kg (162 lb 14.7 oz)   SpO2 92%   BMI 23.32 kg/m²     Gen: arousable, NAD, appropriately conversational  Cardiac: RRR to peripheral palpation  Resp: nonlabored on RA  GI: soft, nondistended    MSK:  GEN - NAD, resting comfortably in hospital bed  HEENT - MMM, EOMI, NCAT  CV - RRR by peripheral palpation, limbs wwp  PULM - NWOB on RA  NEURO - MAGANA spontaneously, CNs II - XII grossly intact  PSYCH - Appropriate mood and affect    RLE:   - Post-operative dressing in place without strikethrough bleeding.   -Motor intact in DF/PF/EHL/FHL  -SILT in saph/sural/SPN/DPN distributions  -Foot wwp, 2+ DP/PT pulse, brisk cap refill  -Compartments soft and compressible, no pain with passive dorsiflexion      Assessment/Plan: 64 y.o. male s/p R knee poly exchange on 8/30/24 with Dr. Brewer.      Plan:  - Weight bearing: WBAT RLE  - DVT ppx: SCDs, ASA bid  - Diet: Regular  - Pain: Tylenol, oxycodone 5/10, dilaudid breakthrough  - Antibiotics: perioperative ancef 2g q8hr x3 doses. Discharge with 10 days of Bactrim DS  - FEN: HLIV with good PO intake  - Bowel Regimen: Miralax, senna, dulcolax  - PT/OT  - Pulm: Encourage IS  - Continue home medications  - No blanton    Dispo: pending PT, likely home today      Stewart Santillan MD   Orthopedic Surgery PGY1  [facility]     This patient will be followed by the Orthopaedic Trauma service. Please page or Epic Chat the corresponding residents below with questions or concerns.       Ortho Trauma Service (Epic Chat Preferred)  First call: Stewart Santillan MD PGY-1  First call: Tavares Dominguez MD PGY-1  Second call: Lambert Li, PGY-2  Third call: Juice" Sofia, PGY-3    6pm-6am M-F, Holidays, and weekends page Ortho on-call [phone] with urgent questions/concerns.

## 2024-08-31 NOTE — PROGRESS NOTES
Parth Schreiber is a 64 y.o. male on day 1 of admission presenting with Status post total knee replacement using cement, right.    Postop Pain HPI -   Palliative: relieved with IV analgesics and regional local anesthetics  Provocative: movement  Quality:  burning and aching  Radiation:  none  Severity:  7/10  Timing: constant    24-HOUR OPIOID CONSUMPTION:  Dilaudid 1.7  Oxycodone 40    Scheduled medications  acetaminophen, 975 mg, oral, q6h DAVID  aspirin, 81 mg, oral, BID  calcium carbonate-vitamin D3, 1 tablet, oral, Daily  docusate sodium, 100 mg, oral, BID  ketorolac, 30 mg, intravenous, q6h  magnesium oxide, 400 mg, oral, Daily  polyethylene glycol, 17 g, oral, Daily  povidone-iodine, , Topical, Once      Continuous medications  lactated Ringer's, 100 mL/hr, Last Rate: 100 mL/hr (08/31/24 0053)  oxygen, 2 L/min, Last Rate: Stopped (08/30/24 1719)      PRN medications  PRN medications: bisacodyl, bisacodyl, dextrose, dextrose, diphenhydrAMINE, glucagon, glucagon, HYDROmorphone, naloxone, naloxone, naloxone, ondansetron ODT **OR** ondansetron, oxyCODONE, oxyCODONE, promethazine **OR** promethazine     Physical Exam:  Constitutional:  no distress, alert and cooperative  Eyes: clear sclera  Head/Neck: No apparent injury, trachea midline  Respiratory/Thorax: Patent airways, thorax symmetric, breathing comfortably  Cardiovascular: no pitting edema  Gastrointestinal: Nondistended  Musculoskeletal: ROM intact  Extremities: no clubbing  Neurological: alert, hairston x4  Psychological: Appropriate affect    Results for orders placed or performed during the hospital encounter of 08/30/24 (from the past 24 hour(s))   Verify ABO/Rh Group Test (VERAB)   Result Value Ref Range    ABO TYPE A     Rh TYPE POS               Assessment/Plan   Assessment & Plan  Status post total knee replacement using cement, right    Polyethylene wear of right knee joint prosthesis, initial encounter (CMS-HCC)    Loosening of prosthesis of right total  knee replacement, initial encounter (CMS-[facility])    Parth Schreiber is a 63 y.o. year old male patient who presents for Right  knee revision total arthroplasty with Dr. Brewer. Acute Pain consulted for block for postoperative pain control.      Plan:     - Right AC single shot block performed preoperatively on 8/30/24  - Rest of pain management per primary team  - Acute Pain will be signing off.     Acute Pain Resident  pg [phone] ph [phone]     Erin Pandey MD

## 2024-08-31 NOTE — PROGRESS NOTES
Physical Therapy    Physical Therapy Evaluation & Treatment    Patient Name: Parth Schreiber  MRN: [id number]  Today's Date: 8/31/2024   Time Calculation  Start Time: 0905  Stop Time: 0933  Time Calculation (min): 28 min    Assessment/Plan   PT Assessment  PT Assessment Results: Decreased strength, Decreased range of motion, Decreased endurance, Impaired balance, Decreased mobility, Pain  Rehab Prognosis: Excellent  Barriers to Discharge: Mecial acuity  Evaluation/Treatment Tolerance: Patient tolerated treatment well  Medical Staff Made Aware: Yes  End of Session Communication: Bedside nurse  Assessment Comment: Previously independent 64 y.o. male presents S/p R knee poly exchange on 8/30/24 . Pt able to ambulate >200ft and negotiate stairs this session. Pt is appropriate for Low Intensity Rehab at home or at outpatient facility after DC.  End of Session Patient Position: Bed, 3 rail up, Alarm on   IP OR SWING BED PT PLAN  Inpatient or Swing Bed: Inpatient  PT Plan  Treatment/Interventions: Bed mobility, Transfer training, Gait training, Stair training, Strengthening, Range of motion, Endurance training, Therapeutic exercise, Therapeutic activity  PT Plan: Ongoing PT  PT Frequency: Daily  PT Discharge Recommendations: Low intensity level of continued care  Equipment Recommended upon Discharge:  (Owns)  PT Recommended Transfer Status: Contact guard, Assistive device  PT - OK to Discharge: Yes      Subjective     General Visit Information:  General  Reason for Referral: S/p R knee poly exchange on 8/30/24  Past Medical History Relevant to Rehab: Depression, Osteoporosis, HTN, HLD, arthritis  Family/Caregiver Present: No  Prior to Session Communication: Bedside nurse  Patient Position Received: Bed, 3 rail up, Alarm on  Preferred Learning Style: auditory, verbal, visual  General Comment: Pt pleasant and agreeable to PT session  Home Living:  Home Living  Type of Home: Apartment (2nd floor)  Lives With: Alone  Home Adaptive  Equipment: Walker rolling or standard, Cane, Crutches  Home Layout: One level  Home Access: Stairs to enter with rails  Entrance Stairs-Rails: Right  Entrance Stairs-Number of Steps: 12  Bathroom Shower/Tub: Tub/shower unit  Bathroom Toilet: Standard  Bathroom Equipment: Shower chair with back  Home Living Comments: Pt reports his sister and brother-in-law can help out as needed  Prior Level of Function:  Prior Function Per Pt/Caregiver Report  Level of [place]: Independent with ADLs and functional transfers, Independent with homemaking with ambulation  Ambulatory Assistance: Independent  Precautions:  Precautions  LE Weight Bearing Status: Weight Bearing as Tolerated (RLE)  Medical Precautions: Fall precautions  Post-Surgical Precautions: Right total knee precautions    Objective   Pain:  Pain Assessment  Pain Assessment: 0-10  0-10 (Numeric) Pain Score: 7  Pain Type: Surgical pain  Pain Location: Knee  Pain Orientation: Right  Pain Interventions: Ambulation/increased activity  Cognition:  Cognition  Orientation Level: Oriented X4    General Assessments:    Activity Tolerance  Endurance: Tolerates 30 min exercise with multiple rests  Early Mobility/Exercise Safety Screen: Proceed with mobilization - No exclusion criteria met    Sensation  Light Touch: No apparent deficits    Coordination  Movements are Fluid and Coordinated: Yes    Postural Control  Postural Control: Within Functional Limits    Static Sitting Balance  Static Sitting-Balance Support: Feet supported  Static Sitting-Level of Assistance: Distant supervision  Static Sitting-Comment/Number of Minutes: 10 mins    Static Standing Balance  Static Standing-Balance Support: Bilateral upper extremity supported, No upper extremity supported (Able to let go of walker)  Static Standing-Level of Assistance: Close supervision  Static Standing-Comment/Number of Minutes: 15 mins  Functional Assessments:     Bed Mobility  Bed Mobility: Yes  Bed Mobility 1  Bed  Mobility 1: Supine to sitting  Level of Assistance 1: Close supervision, Minimal verbal cues  Bed Mobility 2  Bed Mobility  2: Sitting to supine  Level of Assistance 2: Close supervision, Minimal verbal cues    Transfers  Transfer: Yes  Transfer 1  Transfer From 1: Stand to  Transfer to 1: Bed  Technique 1: Stand to sit  Transfer Device 1: Walker  Transfer Level of Assistance 1: Contact guard, Minimal verbal cues  Transfers 2  Transfer From 2: Bed to  Transfer to 2: Stand  Technique 2: Sit to stand  Transfer Device 2: Walker  Transfer Level of Assistance 2: Contact guard, Minimal verbal cues    Ambulation/Gait Training  Ambulation/Gait Training Performed: Yes  Ambulation/Gait Training 1  Surface 1: Level tile  Device 1: Rolling walker  Assistance 1: Contact guard, Minimal verbal cues  Quality of Gait 1: Antalgic  Comments/Distance (ft) 1: 125ft X2    Stairs  Stairs: Yes  Stairs  Rails 1: Bilateral  Device 1: Railing  Assistance 1: Contact guard  Comment/Number of Steps 1: 4  Stairs 2  Rails 2: Right  Device 2: Railing  Assistance 2: Contact guard  Comment/Number of Steps 2: 4    Extremity/Trunk Assessments:    RLE   RLE : Exceptions to WFL  Strength RLE  RLE Overall Strength: Greater than or equal to 3/5 as evidenced by functional mobility, Due to pain  LLE   LLE : Within Functional Limits  Treatments:  Treatment for increased time with ambulation and practicing stair negotiation.     Outcome Measures:  [facility] Basic Mobility  Turning from your back to your side while in a flat bed without using bedrails: None  Moving from lying on your back to sitting on the side of a flat bed without using bedrails: None  Moving to and from bed to chair (including a wheelchair): A little  Standing up from a chair using your arms (e.g. wheelchair or bedside chair): A little  To walk in hospital room: A little  Climbing 3-5 steps with railing: A little  Basic Mobility - Total Score: 20    Encounter Problems       Encounter Problems  (Active)       Balance       STG - Maintains dynamic standing balance without upper extremity support (Progressing)       Start:  08/31/24    Expected End:  09/14/24       INTERVENTIONS:  1. Practice standing with minimal support.  2. Educate patient about standing tolerance.  3. Educate patient about independence with gait, transfers, and ADL's.  4. Educate patient about use of assistive device.  5. Educate patient about self-directed care.            Mobility       LTG - Patient will ambulate community distance indep with LRD (Progressing)       Start:  08/31/24    Expected End:  09/14/24            LTG - Patient will navigate 4-6 steps indep with rails/device (Progressing)       Start:  08/31/24    Expected End:  09/14/24               PT Transfers       STG - Patient will transfer sit to and from stand indep with LRD (Progressing)       Start:  08/31/24    Expected End:  09/14/24               Pain - Adult              Education Documentation  Precautions, taught by Ammy Giordano PT at 8/31/2024 10:33 AM.  Learner: Patient  Readiness: Acceptance  Method: Explanation  Response: Verbalizes Understanding    Body Mechanics, taught by Ammy Giordano, PT at 8/31/2024 10:33 AM.  Learner: Patient  Readiness: Acceptance  Method: Explanation  Response: Verbalizes Understanding    Mobility Training, taught by Ammy Giordano PT at 8/31/2024 10:33 AM.  Learner: Patient  Readiness: Acceptance  Method: Explanation  Response: Verbalizes Understanding    Education Comments  No comments found.

## 2024-09-01 ENCOUNTER — PHARMACY VISIT (OUTPATIENT)
Dept: PHARMACY | Facility: CLINIC | Age: 64
End: 2024-09-01
Payer: COMMERCIAL

## 2024-09-01 VITALS
DIASTOLIC BLOOD PRESSURE: 62 MMHG | WEIGHT: 162.92 LBS | OXYGEN SATURATION: 95 % | HEART RATE: 61 BPM | BODY MASS INDEX: 23.32 KG/M2 | SYSTOLIC BLOOD PRESSURE: 107 MMHG | HEIGHT: 70 IN | RESPIRATION RATE: 16 BRPM | TEMPERATURE: 97.5 F

## 2024-09-01 LAB
ANION GAP SERPL CALC-SCNC: 11 MMOL/L (ref 10–20)
BACTERIA SPEC CULT: NORMAL
BUN SERPL-MCNC: 27 MG/DL (ref 6–23)
CALCIUM SERPL-MCNC: 8.9 MG/DL (ref 8.6–10.6)
CHLORIDE SERPL-SCNC: 100 MMOL/L (ref 98–107)
CO2 SERPL-SCNC: 25 MMOL/L (ref 21–32)
CREAT SERPL-MCNC: 1.87 MG/DL (ref 0.5–1.3)
EGFRCR SERPLBLD CKD-EPI 2021: 40 ML/MIN/1.73M*2
ERYTHROCYTE [DISTWIDTH] IN BLOOD BY AUTOMATED COUNT: 13.4 % (ref 11.5–14.5)
FUNGUS SPEC FUNGUS STN: NORMAL
GLUCOSE SERPL-MCNC: 92 MG/DL (ref 74–99)
GRAM STN SPEC: NORMAL
HCT VFR BLD AUTO: 28.8 % (ref 41–52)
HGB BLD-MCNC: 9.5 G/DL (ref 13.5–17.5)
MCH RBC QN AUTO: 31.3 PG (ref 26–34)
MCHC RBC AUTO-ENTMCNC: 33 G/DL (ref 32–36)
MCV RBC AUTO: 95 FL (ref 80–100)
NRBC BLD-RTO: 0 /100 WBCS (ref 0–0)
PLATELET # BLD AUTO: 202 X10*3/UL (ref 150–450)
POTASSIUM SERPL-SCNC: 4.3 MMOL/L (ref 3.5–5.3)
RBC # BLD AUTO: 3.04 X10*6/UL (ref 4.5–5.9)
SODIUM SERPL-SCNC: 132 MMOL/L (ref 136–145)
WBC # BLD AUTO: 9.3 X10*3/UL (ref 4.4–11.3)

## 2024-09-01 PROCEDURE — 2500000001 HC RX 250 WO HCPCS SELF ADMINISTERED DRUGS (ALT 637 FOR MEDICARE OP)

## 2024-09-01 PROCEDURE — 2500000004 HC RX 250 GENERAL PHARMACY W/ HCPCS (ALT 636 FOR OP/ED)

## 2024-09-01 PROCEDURE — 82374 ASSAY BLOOD CARBON DIOXIDE: CPT

## 2024-09-01 PROCEDURE — 85027 COMPLETE CBC AUTOMATED: CPT

## 2024-09-01 PROCEDURE — 36415 COLL VENOUS BLD VENIPUNCTURE: CPT

## 2024-09-01 ASSESSMENT — COGNITIVE AND FUNCTIONAL STATUS - GENERAL
DAILY ACTIVITIY SCORE: 24
WALKING IN HOSPITAL ROOM: A LITTLE
MOBILITY SCORE: 22
CLIMB 3 TO 5 STEPS WITH RAILING: A LITTLE

## 2024-09-01 ASSESSMENT — PAIN SCALES - GENERAL
PAINLEVEL_OUTOF10: 0 - NO PAIN
PAINLEVEL_OUTOF10: 5 - MODERATE PAIN

## 2024-09-01 ASSESSMENT — PAIN - FUNCTIONAL ASSESSMENT: PAIN_FUNCTIONAL_ASSESSMENT: 0-10

## 2024-09-01 ASSESSMENT — PAIN SCALES - PAIN ASSESSMENT IN ADVANCED DEMENTIA (PAINAD): TOTALSCORE: MEDICATION (SEE MAR)

## 2024-09-01 NOTE — PROGRESS NOTES
"Orthopaedic Surgery Progress Note    Subjective: No acute events overnight.  Denies fevers, chills, nausea, vomiting.  Denies chest pain or shortness of breath.  Compressive bandage in place without strikethrough.    Objective:  /62 (BP Location: Left arm, Patient Position: Sitting)   Pulse 61   Temp 36.4 °C (97.5 °F) (Temporal)   Resp 16   Ht 1.78 m (5' 10.08\")   Wt 73.9 kg (162 lb 14.7 oz)   SpO2 95%   BMI 23.32 kg/m²     Gen: arousable, NAD, appropriately conversational  Cardiac: RRR to peripheral palpation  Resp: nonlabored on RA  GI: soft, nondistended    MSK:  GEN - NAD, resting comfortably in hospital bed  HEENT - MMM, EOMI, NCAT  CV - RRR by peripheral palpation, limbs wwp  PULM - NWOB on RA  NEURO - MAGANA spontaneously, CNs II - XII grossly intact  PSYCH - Appropriate mood and affect    RLE:   - Post-operative dressing in place without strikethrough bleeding.   -Motor intact in DF/PF/EHL/FHL  -SILT in saph/sural/SPN/DPN distributions  -Foot wwp, 2+ DP/PT pulse, brisk cap refill  -Compartments soft and compressible, no pain with passive dorsiflexion      Assessment/Plan: 64 y.o. male s/p R knee poly exchange on 8/30/24 with Dr. Brewer.      Plan:  - Weight bearing: WBAT RLE  - DVT ppx: SCDs, ASA bid  - Diet: Regular  - Pain: Tylenol, oxycodone 5/10, dilaudid breakthrough  - Antibiotics: perioperative ancef 2g q8hr x3 doses. Discharge with 10 days of Bactrim DS  - FEN: HLIV with good PO intake  - Bowel Regimen: Miralax, senna, dulcolax  - PT/OT  - Pulm: Encourage IS  - Continue home medications  - No blanton    Dispo: home today    Juiec Black MD  PGY-3 Orthopedic Surgery  [facility]  Friend Trusted Chat Preferred  Pager: [phone]      This patient will be followed by the Orthopaedic Trauma service. Please page or Epic Chat the corresponding residents below with questions or concerns.       Ortho Trauma Service (Epic Chat Preferred)  First call: Stewart Santillan MD PGY-1  First call: Tavares " MD Alberto PGY-1  Second call: Danish Travis PGY-2  Third call: Juice Black PGY-3    6pm-6am M-F, Holidays, and weekends page Ortho on-call [phone] with urgent questions/concerns.

## 2024-09-01 NOTE — CARE PLAN
Problem: Pain - Adult  Goal: Verbalizes/displays adequate comfort level or baseline comfort level  Outcome: Progressing     Problem: Safety - Adult  Goal: Free from fall injury  Outcome: Progressing     Problem: Discharge Planning  Goal: Discharge to home or other facility with appropriate resources  Outcome: Progressing     Problem: Chronic Conditions and Co-morbidities  Goal: Patient's chronic conditions and co-morbidity symptoms are monitored and maintained or improved  Outcome: Progressing     Problem: Fall/Injury  Goal: Not fall by end of shift  Outcome: Progressing  Goal: Be free from injury by end of the shift  Outcome: Progressing  Goal: Verbalize understanding of personal risk factors for fall in the hospital  Outcome: Progressing  Goal: Verbalize understanding of risk factor reduction measures to prevent injury from fall in the home  Outcome: Progressing  Goal: Use assistive devices by end of the shift  Outcome: Progressing  Goal: Pace activities to prevent fatigue by end of the shift  Outcome: Progressing     Problem: Pain  Goal: Takes deep breaths with improved pain control throughout the shift  Outcome: Progressing  Goal: Turns in bed with improved pain control throughout the shift  Outcome: Progressing  Goal: Walks with improved pain control throughout the shift  Outcome: Progressing  Goal: Performs ADL's with improved pain control throughout shift  Outcome: Progressing  Goal: Participates in PT with improved pain control throughout the shift  Outcome: Progressing  Goal: Free from opioid side effects throughout the shift  Outcome: Progressing  Goal: Free from acute confusion related to pain meds throughout the shift  Outcome: Progressing   The patient's goals for the shift include      The clinical goals for the shift include patient will have adequate pain control this shift  .

## 2024-09-01 NOTE — CARE PLAN
The patient's goals for the shift include      The clinical goals for the shift include free from falls and injuries

## 2024-09-01 NOTE — PROGRESS NOTES
Received care of pt at 1900hrs met patient in the room still endorsing pain.  Patient was asking for his take home narcotic but was told pharmacy is closed .  Patient to collect his oxycodone from pharmacy before going home tomorrow.

## 2024-09-01 NOTE — NURSING NOTE
Patient discharge instructions given to patient and medications given to patient.  IV taken out. Patient belongings sent with the patient. Patient safely transferred out via wheelchair.

## 2024-09-01 NOTE — PROGRESS NOTES
Patient still endorsing pain 7/10   Patient to  collect his  home med (Oxycodone)  from pharmacy tomorrow before going home .

## 2024-09-01 NOTE — DISCHARGE SUMMARY
Discharge Diagnosis  Status post total knee replacement using cement, right    Issues Requiring Follow-Up  Routine Postoperative Followup    Test Results Pending At Discharge  Pending Labs       Order Current Status    Fungal Culture/Smear In process            Hospital Course  64 year-old male who presented with Right total knee instability. Patient is now s/p Right knee polyethylene liner exchange on 8/30 by Dr. Brewer. On the day of surgery, patient was identified in the pre-operative holding area and agreeable to proceed with surgery. Written consent was obtained.  Please see operative note for further details of this procedure. Patient received 24 hours of juan-operative antibiotics. Patient recovered in the PACU before transfer to a regular nursing floor. Patient was started on oxycodone, tylenol for pain control and ASA 81 mg bid for DVT prophylaxis. Physical therapy recommended continued recovery at home with continued physical therapy and wound care. On the day of discharge, patient was afebrile with stable vital signs. Patient was neurovascularly intact at time of discharge. Patient was discharged with prescription of ASA 81 mg bid for DVT prophylaxis for 6 weeks. Patient will follow-up with Dr. Brewer in 2 weeks for post-operative visit.    Home Medications     Medication List      START taking these medications     acetaminophen 500 mg tablet; Commonly known as: Tylenol; Take 1 tablet   (500 mg) by mouth every 6 hours.   aspirin 81 mg EC tablet; Take 1 tablet (81 mg) by mouth 2 times a day.   oxyCODONE 5 mg immediate release tablet; Commonly known as: Roxicodone;   Take 1 tablet (5 mg) by mouth every 6 hours if needed for severe pain (7 -   10).   pantoprazole 40 mg EC tablet; Commonly known as: ProtoNix; Take 1 tablet   (40 mg) by mouth once daily in the morning. Take before meals. Do not   crush, chew, or split.   polyethylene glycol 17 gram/dose powder; Commonly known as: Glycolax,   Miralax; Take  17 g by mouth once daily.   sulfamethoxazole-trimethoprim 800-160 mg tablet; Commonly known as:   Bactrim DS; Take 1 tablet by mouth 2 times a day for 10 days.     CHANGE how you take these medications     traMADol 50 mg tablet; Commonly known as: Ultram; Take 1 tablet (50 mg)   by mouth every 6 hours if needed for moderate pain (4 - 6).; What changed:   reasons to take this     CONTINUE taking these medications     b complex 0.4 mg tablet   calcium carbonate 500 mg calcium (1,250 mg) tablet; Commonly known as:   Oscal   calcium carbonate-vitamin D3 500 mg-5 mcg (200 unit) tablet   lisinopriL-hydrochlorothiazide 10-12.5 mg tablet   magnesium oxide 420 mg tablet; Commonly known as: Mag-Ox   rosuvastatin 5 mg tablet; Commonly known as: Crestor       Physical Exam  Constitutional: No acute distress, cooperative   Eyes: EOM grossly intact   Head/Neck: Trachea midline   Respiratory/Thorax: Normal work of breathing   Cardiovascular: RRR on peripheral palpation   Gastrointestinal: Nondistended   Extremities: moves extremities   Psychological: Appropriate mood/behavior   Skin: Warm and dry.     RLE:   - Post-operative dressing in place without strikethrough bleeding.   -Motor intact in DF/PF/EHL/FHL  -SILT in saph/sural/SPN/DPN distributions  -Foot wwp, 2+ DP/PT pulse, brisk cap refill  -Compartments soft and compressible, no pain with passive dorsiflexion    Outpatient Follow-Up  Future Appointments   Date Time Provider Department Center   9/23/2024 11:40 AM Jeanne Fulton PA-C [id number] Academic       Juice Black MD  PGY-3 Orthopedic Surgery  [facility]  Epic Chat Preferred  Pager: [phone]

## 2024-09-01 NOTE — CARE PLAN
Problem: Pain - Adult  Goal: Verbalizes/displays adequate comfort level or baseline comfort level  9/1/2024 0901 by Meme Girard RN  Outcome: Met  9/1/2024 0859 by Meme Girard RN  Outcome: Progressing     Problem: Safety - Adult  Goal: Free from fall injury  9/1/2024 0901 by Meme Girard RN  Outcome: Met  9/1/2024 0859 by Meme Girard RN  Outcome: Progressing     Problem: Discharge Planning  Goal: Discharge to home or other facility with appropriate resources  9/1/2024 0901 by Meme Girard RN  Outcome: Met  9/1/2024 0859 by Meme Girard RN  Outcome: Progressing     Problem: Chronic Conditions and Co-morbidities  Goal: Patient's chronic conditions and co-morbidity symptoms are monitored and maintained or improved  9/1/2024 0901 by Meme Girard RN  Outcome: Met  9/1/2024 0859 by Meme Girard RN  Outcome: Progressing     Problem: Fall/Injury  Goal: Not fall by end of shift  9/1/2024 0901 by Meme Girard RN  Outcome: Met  9/1/2024 0859 by Meme Girard RN  Outcome: Progressing  Goal: Be free from injury by end of the shift  9/1/2024 0901 by Meme Girard RN  Outcome: Met  9/1/2024 0859 by Meme Girard RN  Outcome: Progressing  Goal: Verbalize understanding of personal risk factors for fall in the hospital  9/1/2024 0901 by Meme Girard RN  Outcome: Met  9/1/2024 0859 by Meme Girard RN  Outcome: Progressing  Goal: Verbalize understanding of risk factor reduction measures to prevent injury from fall in the home  9/1/2024 0901 by Meme Girard RN  Outcome: Met  9/1/2024 0859 by Meme Girard RN  Outcome: Progressing  Goal: Use assistive devices by end of the shift  9/1/2024 0901 by Meme Girard RN  Outcome: Met  9/1/2024 0859 by Meme Girard RN  Outcome: Progressing  Goal: Pace activities to prevent fatigue by end of the shift  9/1/2024 0901 by Meme Girard RN  Outcome: Met  9/1/2024 0859 by Meme Girard RN  Outcome: Progressing     Problem: Pain  Goal: Takes deep breaths with improved pain control  throughout the shift  9/1/2024 0901 by Meme Girard RN  Outcome: Met  9/1/2024 0859 by Meme Girard RN  Outcome: Progressing  Goal: Turns in bed with improved pain control throughout the shift  9/1/2024 0901 by Meme Girard RN  Outcome: Met  9/1/2024 0859 by Meme Girard RN  Outcome: Progressing  Goal: Walks with improved pain control throughout the shift  9/1/2024 0901 by Meme Girard RN  Outcome: Met  9/1/2024 0859 by Meme Girard RN  Outcome: Progressing  Goal: Performs ADL's with improved pain control throughout shift  9/1/2024 0901 by Meme Girard RN  Outcome: Met  9/1/2024 0859 by Meme Girard RN  Outcome: Progressing  Goal: Participates in PT with improved pain control throughout the shift  9/1/2024 0901 by Meme Girard RN  Outcome: Met  9/1/2024 0859 by Meme Girard RN  Outcome: Progressing  Goal: Free from opioid side effects throughout the shift  9/1/2024 0901 by Meme Girard RN  Outcome: Met  9/1/2024 0859 by Meme Girard RN  Outcome: Progressing  Goal: Free from acute confusion related to pain meds throughout the shift  9/1/2024 0901 by Meme Girard RN  Outcome: Met  9/1/2024 0859 by Meme Girard RN  Outcome: Progressing   The patient's goals for the shift include      The clinical goals for the shift include patient will have adequate pain control this shift

## 2024-09-04 LAB
FUNGUS SPEC CULT: NORMAL
FUNGUS SPEC FUNGUS STN: NORMAL

## 2024-09-06 ENCOUNTER — HOME CARE VISIT (OUTPATIENT)
Dept: HOME HEALTH SERVICES | Facility: HOME HEALTH | Age: 64
End: 2024-09-06

## 2024-09-07 ENCOUNTER — HOME CARE VISIT (OUTPATIENT)
Dept: HOME HEALTH SERVICES | Facility: HOME HEALTH | Age: 64
End: 2024-09-07
Payer: COMMERCIAL

## 2024-09-07 PROCEDURE — G0151 HHCP-SERV OF PT,EA 15 MIN: HCPCS

## 2024-09-07 SDOH — HEALTH STABILITY: PHYSICAL HEALTH: EXERCISE ACTIVITIES SETS: 1

## 2024-09-07 SDOH — HEALTH STABILITY: PHYSICAL HEALTH: PHYSICAL EXERCISE: SUPINE

## 2024-09-07 SDOH — HEALTH STABILITY: PHYSICAL HEALTH: PHYSICAL EXERCISE: 10

## 2024-09-07 SDOH — HEALTH STABILITY: PHYSICAL HEALTH: EXERCISE ACTIVITY: ANKLE PUMPS, QUAD AND GLUT SETS, SAQ AND HEEL SLIDES AAROM

## 2024-09-07 SDOH — HEALTH STABILITY: PHYSICAL HEALTH: EXERCISE TYPE: TKR PROTOCOL

## 2024-09-07 ASSESSMENT — ACTIVITIES OF DAILY LIVING (ADL)
AMBULATION ASSISTANCE: 1
ENTERING_EXITING_HOME: MINIMUM ASSIST
OASIS_M1830: 05
PHYSICAL TRANSFERS ASSESSED: 1
AMBULATION ASSISTANCE: STAND BY ASSIST
CURRENT_FUNCTION: STAND BY ASSIST

## 2024-09-07 ASSESSMENT — ENCOUNTER SYMPTOMS
LOWEST PAIN SEVERITY IN PAST 24 HOURS: 5/10
HIGHEST PAIN SEVERITY IN PAST 24 HOURS: 5/10
PAIN SEVERITY GOAL: 0/10
PERSON REPORTING PAIN: PATIENT
PAIN LOCATION: RIGHT KNEE
OCCASIONAL FEELINGS OF UNSTEADINESS: 1
SUBJECTIVE PAIN PROGRESSION: GRADUALLY IMPROVING
PAIN: 1

## 2024-09-09 ENCOUNTER — HOME CARE VISIT (OUTPATIENT)
Dept: HOME HEALTH SERVICES | Facility: HOME HEALTH | Age: 64
End: 2024-09-09
Payer: COMMERCIAL

## 2024-09-09 LAB
FUNGUS SPEC CULT: NORMAL
FUNGUS SPEC FUNGUS STN: NORMAL

## 2024-09-09 PROCEDURE — G0157 HHC PT ASSISTANT EA 15: HCPCS | Mod: CQ

## 2024-09-10 ENCOUNTER — HOME CARE VISIT (OUTPATIENT)
Dept: HOME HEALTH SERVICES | Facility: HOME HEALTH | Age: 64
End: 2024-09-10
Payer: COMMERCIAL

## 2024-09-11 ENCOUNTER — HOME CARE VISIT (OUTPATIENT)
Dept: HOME HEALTH SERVICES | Facility: HOME HEALTH | Age: 64
End: 2024-09-11
Payer: COMMERCIAL

## 2024-09-11 PROCEDURE — G0157 HHC PT ASSISTANT EA 15: HCPCS | Mod: CQ

## 2024-09-13 ENCOUNTER — HOME CARE VISIT (OUTPATIENT)
Dept: HOME HEALTH SERVICES | Facility: HOME HEALTH | Age: 64
End: 2024-09-13
Payer: COMMERCIAL

## 2024-09-13 PROCEDURE — G0157 HHC PT ASSISTANT EA 15: HCPCS | Mod: CQ

## 2024-09-16 ENCOUNTER — APPOINTMENT (OUTPATIENT)
Dept: ORTHOPEDIC SURGERY | Facility: HOSPITAL | Age: 64
End: 2024-09-16
Payer: COMMERCIAL

## 2024-09-17 ENCOUNTER — HOME CARE VISIT (OUTPATIENT)
Dept: HOME HEALTH SERVICES | Facility: HOME HEALTH | Age: 64
End: 2024-09-17
Payer: COMMERCIAL

## 2024-09-17 LAB
FUNGUS SPEC CULT: NORMAL
FUNGUS SPEC FUNGUS STN: NORMAL

## 2024-09-17 PROCEDURE — G0157 HHC PT ASSISTANT EA 15: HCPCS | Mod: CQ

## 2024-09-19 ENCOUNTER — HOME CARE VISIT (OUTPATIENT)
Dept: HOME HEALTH SERVICES | Facility: HOME HEALTH | Age: 64
End: 2024-09-19
Payer: COMMERCIAL

## 2024-09-19 PROCEDURE — G0157 HHC PT ASSISTANT EA 15: HCPCS | Mod: CQ

## 2024-09-23 ENCOUNTER — HOSPITAL ENCOUNTER (OUTPATIENT)
Dept: RADIOLOGY | Facility: HOSPITAL | Age: 64
Discharge: HOME | End: 2024-09-23
Payer: COMMERCIAL

## 2024-09-23 ENCOUNTER — OFFICE VISIT (OUTPATIENT)
Dept: ORTHOPEDIC SURGERY | Facility: HOSPITAL | Age: 64
End: 2024-09-23
Payer: COMMERCIAL

## 2024-09-23 DIAGNOSIS — Z96.651 STATUS POST REVISION OF TOTAL KNEE REPLACEMENT, RIGHT: ICD-10-CM

## 2024-09-23 PROCEDURE — 99211 OFF/OP EST MAY X REQ PHY/QHP: CPT | Mod: 25 | Performed by: PHYSICIAN ASSISTANT

## 2024-09-23 PROCEDURE — 73560 X-RAY EXAM OF KNEE 1 OR 2: CPT | Mod: RT

## 2024-09-24 ENCOUNTER — HOME CARE VISIT (OUTPATIENT)
Dept: HOME HEALTH SERVICES | Facility: HOME HEALTH | Age: 64
End: 2024-09-24
Payer: COMMERCIAL

## 2024-09-24 PROCEDURE — G0157 HHC PT ASSISTANT EA 15: HCPCS | Mod: CQ

## 2024-09-25 NOTE — PROGRESS NOTES
Patient is a 64 y.o. male who is 3 weeks s/p revision R TKA - tibial component poly.  Date of surgery was 8/30/2024.  Patient continues WBAT RLE at this time and denies issues with incision. Patient continues on ASA for DVT ppx. Patient continues with therapy sessions, performing exercise program at home. Patient denies fever or chills, N/T or calf pain.     General: Alert and oriented x 3, NAD, respirations easy and unlabored with no audible wheezes, skin warm and dry, speech and dress appropriate for noted age, affect euthymic.     Musculoskeletal: RLE  incisions c/d/i  mild swelling to lower leg  compartments soft  no calf tenderness  sensation intact to light touch  motor intact including TA/GS/EHL  palpable DP/PT pulses 2+   Knee motion: 0-95 degrees    XR: Images of R TKA knee reviewed personally by me today and show maintenance of alignment of prosthesis with hardware in position and no interval change. Excellent mechanical alignment.     IMP:  Problem List Items Addressed This Visit    None  Visit Diagnoses       Status post revision of total knee replacement, right        Relevant Orders    XR knee right 1-2 views (Completed)            PLAN:  He will continue with his HC and then transition to outpatient PT sessions as discussed.  I will see patient back in 2 months and no x-rays next visit. All questions were answered today.

## 2024-09-26 ENCOUNTER — HOME CARE VISIT (OUTPATIENT)
Dept: HOME HEALTH SERVICES | Facility: HOME HEALTH | Age: 64
End: 2024-09-26
Payer: COMMERCIAL

## 2024-09-26 VITALS — RESPIRATION RATE: 20 BRPM

## 2024-09-26 PROCEDURE — G0151 HHCP-SERV OF PT,EA 15 MIN: HCPCS

## 2024-09-26 SDOH — HEALTH STABILITY: PHYSICAL HEALTH: EXERCISE ACTIVITY: TKR PROTOCOL

## 2024-09-26 SDOH — HEALTH STABILITY: PHYSICAL HEALTH: PHYSICAL EXERCISE: 20

## 2024-09-26 SDOH — HEALTH STABILITY: PHYSICAL HEALTH: EXERCISE TYPE: WHEP

## 2024-09-26 SDOH — HEALTH STABILITY: PHYSICAL HEALTH: PHYSICAL EXERCISE: SUPINE, SITTING AND STANDING

## 2024-09-26 SDOH — HEALTH STABILITY: PHYSICAL HEALTH: EXERCISE ACTIVITIES SETS: 1

## 2024-09-26 ASSESSMENT — ENCOUNTER SYMPTOMS
PERSON REPORTING PAIN: PATIENT
HIGHEST PAIN SEVERITY IN PAST 24 HOURS: 6/10
PAIN LOCATION: LEFT KNEE
SUBJECTIVE PAIN PROGRESSION: GRADUALLY IMPROVING
LOWEST PAIN SEVERITY IN PAST 24 HOURS: 1/10
PAIN SEVERITY GOAL: 0/10
PAIN: 1

## 2024-09-26 ASSESSMENT — ACTIVITIES OF DAILY LIVING (ADL)
AMBULATION ASSISTANCE ON FLAT SURFACES: 1
AMBULATION ASSISTANCE ON UNEVEN SURFACES: 1
OASIS_M1830: 01
AMBULATION_DISTANCE/DURATION_TOLERATED: 200 FEET
HOME_HEALTH_OASIS: 00

## 2024-09-26 NOTE — CASE COMMUNICATION
Patient to be discharged from PT and all [place] services with goals met. Instructed patient in signs and symptoms of infection and when to MD or 911.  Patient plans to begin outpatient PT 10.2.24. Yes

## 2024-10-31 PROCEDURE — RXMED WILLOW AMBULATORY MEDICATION CHARGE

## 2024-11-01 ENCOUNTER — PHARMACY VISIT (OUTPATIENT)
Dept: PHARMACY | Facility: CLINIC | Age: 64
End: 2024-11-01
Payer: COMMERCIAL

## 2024-11-18 ENCOUNTER — APPOINTMENT (OUTPATIENT)
Dept: ORTHOPEDIC SURGERY | Facility: HOSPITAL | Age: 64
End: 2024-11-18
Payer: COMMERCIAL

## 2024-11-25 ENCOUNTER — OFFICE VISIT (OUTPATIENT)
Dept: ORTHOPEDIC SURGERY | Facility: HOSPITAL | Age: 64
End: 2024-11-25
Payer: COMMERCIAL

## 2024-11-25 DIAGNOSIS — Z96.651 S/P REVISION OF TOTAL KNEE, RIGHT: Primary | ICD-10-CM

## 2024-11-25 PROCEDURE — 99211 OFF/OP EST MAY X REQ PHY/QHP: CPT | Performed by: PHYSICIAN ASSISTANT

## 2024-11-25 ASSESSMENT — PAIN - FUNCTIONAL ASSESSMENT: PAIN_FUNCTIONAL_ASSESSMENT: 0-10

## 2024-11-25 ASSESSMENT — PAIN SCALES - GENERAL: PAINLEVEL_OUTOF10: 7

## 2024-11-25 ASSESSMENT — PAIN DESCRIPTION - DESCRIPTORS: DESCRIPTORS: ACHING;SHARP;POUNDING

## 2024-11-25 NOTE — PROGRESS NOTES
Patient is a 64 y.o. male who is 11 weeks s/p revision R TKA - tibial component poly.  Date of surgery was 8/30/2024.  Patient continues WBAT RLE at this time and reports that occasionally he will get ecchymosis and swelling to the lateral aspect of his right knee but then it will resolve on its own. The skin around the knee is benign at this time. Patient completed ASA for DVT ppx. Patient continues with outpatient therapy sessions, performing exercise program HEP also. Patient denies fever or chills, N/T or calf pain.     General: Alert and oriented x 3, NAD, respirations easy and unlabored with no audible wheezes, skin warm and dry, speech and dress appropriate for noted age, affect euthymic.     Musculoskeletal: RLE  Incision well-healed, no ecchymosis noted  decreased swelling to lower leg  compartments soft  no calf tenderness  sensation intact to light touch  motor intact including TA/GS/EHL  palpable DP/PT pulses 2+   Knee motion: 0-110 degrees  Strength improving    XR: Images of R TKA knee reviewed personally by me today and show maintenance of alignment of prosthesis with hardware in position and no interval change. Excellent mechanical alignment.     IMP:  Problem List Items Addressed This Visit       S/P revision of total knee, right - Primary    Relevant Orders    XR knee right 1-2 views          PLAN:  He will continue with his outpatient PT sessions as discussed.  Activities as tolerated and wean off cane as able. He will follow up in 4 months and with right TKA knee x-rays STANDING next visit. All questions were answered today.

## 2025-03-03 ENCOUNTER — HOSPITAL ENCOUNTER (OUTPATIENT)
Dept: RADIOLOGY | Facility: HOSPITAL | Age: 65
Discharge: HOME | End: 2025-03-03
Payer: COMMERCIAL

## 2025-03-03 ENCOUNTER — OFFICE VISIT (OUTPATIENT)
Dept: ORTHOPEDIC SURGERY | Facility: HOSPITAL | Age: 65
End: 2025-03-03
Payer: COMMERCIAL

## 2025-03-03 DIAGNOSIS — Z96.651 S/P REVISION OF TOTAL KNEE, RIGHT: ICD-10-CM

## 2025-03-03 PROCEDURE — 73560 X-RAY EXAM OF KNEE 1 OR 2: CPT | Mod: RT

## 2025-03-03 PROCEDURE — 99214 OFFICE O/P EST MOD 30 MIN: CPT | Performed by: ORTHOPAEDIC SURGERY

## 2025-03-03 NOTE — PROGRESS NOTES
Chief complaint my right total knee revision surgery is doing much better.  Still working with physical therapy.    History 64-year-old man who had a revision of his poly component on 8/30/2024 and has been weightbearing as tolerated and doing well with physical therapy.  He still has his appointments at least 6-7 more to go.  He is not using a cane except for long walks now.  He has not had experience much swelling.  He is currently not taking pain medications.  He does complain slightly of the lateral knee numbness.  Also complains that the great toe was numb.    His physical examination reveals a well-developed well-nourished patient of stated age in no acute distress. His mood and affect are appropriate. His sclerae are white his pupils are round and symmetric. His mucous membranes are moist. His neck is supple thyroid is in the midline. No lymph node enlargement. His respirations are nonlabored and chest rises symmetrical. Rate and rhythm of heart is regular by distal pulses. Abdomen is nondistended., No organomegaly. Lower extremities are of equal length.    Musculoskeletal exam right incisions are well-healed.  He has 2 previous long scars because of his multiple surgeries and previous history of tibial plateau fracture ORIF.  But there is no sign of infection.  There is no erythema there is no drainage.  He has intact pulses.  His range of motion is actually excellent at 0 to 120 degrees.    X-rays of right total knee replacement and show excellent alignment with good mechanical axis.  He has a slightly longer tibial stem due to his previous tibial plateau fracture.    Plan would recommend continued outpatient physical therapy maybe another 10 sessions.  His strength is just improving.  I do not need to follow-up again until 1 year after the surgery which is at the end of August.  Would like to get a standing hip to ankle x-ray at that time only.

## (undated) PROCEDURE — 0SUV09Z SUPPLEMENT RIGHT KNEE JOINT, TIBIAL SURFACE WITH LINER, OPEN APPROACH: ICD-10-PCS

## (undated) PROCEDURE — 0SPC09Z REMOVAL OF LINER FROM RIGHT KNEE JOINT, OPEN APPROACH: ICD-10-PCS

## (undated) DEVICE — GOWN, SURGICAL, SMARTGOWN, XLARGE, STERILE

## (undated) DEVICE — Device

## (undated) DEVICE — DRAIN, WOUND, ROUND, W/TROCAR, HOLE PATTERN, 10 IN, MEDIUM, 1/8 X 49 IN

## (undated) DEVICE — HOOD, SURGICAL, FLYTE HYBRID

## (undated) DEVICE — DRAPE, SHEET, EXTREMITY, W/ARM BOARD COVERS, 87 X 106 X 128 IN, DISPOSABLE, LF, STERILE

## (undated) DEVICE — BLADE, SAW, DUAL SAGITTAL, 1.9 X 90 X 30

## (undated) DEVICE — BOLSTER, HIP

## (undated) DEVICE — SUTURE, MONOCRYL, 4-0, 18 IN, PS2, UNDYED

## (undated) DEVICE — CATHETER TRAY, SURESTEP, 16FR, URINE METER W/STATLOCK

## (undated) DEVICE — BLADE, SAW, SAGITTAL, DUAL CUT, 18 X 90 X 1.27

## (undated) DEVICE — CLAMP, DRAPE/TUBE, DISPOSABLE, NS

## (undated) DEVICE — STOCKINETTE, IMPERVIOUS, 12 X 48 IN, LF, STERILE

## (undated) DEVICE — GOWN, ASTOUND, XL

## (undated) DEVICE — STRIP, SKIN CLOSURE, STERI STRIP, REINFORCED, 0.5 X 4 IN

## (undated) DEVICE — DRAPE, BIG CASE, BACK TABLE

## (undated) DEVICE — DRESSING, MEPILEX BORDER, POST-OP AG, 4 X 10 IN

## (undated) DEVICE — APPLICATION KIT, ADVANCED CEMENT MIXING/BIO-PREP CEMENT

## (undated) DEVICE — SUTURE, ETHIBOND EXCEL 1, TAPER POINT CT-1 GREEN 30 INCH

## (undated) DEVICE — COVER, TABLE, UHC

## (undated) DEVICE — PADDING, WEBRIL, UNDERCAST, STERILE, 6 IN

## (undated) DEVICE — HOOK, CEMENT, BLUE

## (undated) DEVICE — STRIP, SKIN CLOSURE, STERI STRIP, REINFORCED, 1 X 5 IN

## (undated) DEVICE — BANDAGE, COFLEX, 4 X 5 YDS, TAN, STERILE, LF

## (undated) DEVICE — COLLECTION/DELIVERY SYSTEM, COPAN ESWAB, REG SIZE SWAB

## (undated) DEVICE — PADDING, WEBRIL, UNDERCAST, STERILE, 4 IN

## (undated) DEVICE — MAYO TRAY, LARGE

## (undated) DEVICE — CLIPPER, SURGICAL BLADE ASSEMBLY, GENERAL PURPOSE, SINGLE USE

## (undated) DEVICE — COVER, CART, 45 X 27 X 48 IN, CLEAR

## (undated) DEVICE — MANIFOLD, 4 PORT NEPTUNE STANDARD

## (undated) DEVICE — SUTURE, VICRYL, 1, 27 IN, CT-1, VIOLET

## (undated) DEVICE — BANDAGE, ELASTIC, ACE, ACE, DOUBLE LENGTH, 6 X 550 IN, LF

## (undated) DEVICE — PREP TRAY, SKIN, DRY, W/GLOVES

## (undated) DEVICE — NEEDLE, SAFETY, 18 G X 1.5 IN

## (undated) DEVICE — SUTURE, VICRYL, 2-0, 27 IN, FSL, UNDYED

## (undated) DEVICE — SPONGE, LAP, XRAY DECT, 18IN X 18IN, W/LOOP, STERILE